# Patient Record
Sex: FEMALE | Race: BLACK OR AFRICAN AMERICAN | NOT HISPANIC OR LATINO | Employment: PART TIME | URBAN - METROPOLITAN AREA
[De-identification: names, ages, dates, MRNs, and addresses within clinical notes are randomized per-mention and may not be internally consistent; named-entity substitution may affect disease eponyms.]

---

## 2020-10-13 RX ORDER — PREDNISONE 20 MG/1
TABLET ORAL
COMMUNITY

## 2020-10-13 RX ORDER — ADAPALENE AND BENZOYL PEROXIDE 3; 25 MG/G; MG/G
GEL TOPICAL
COMMUNITY

## 2020-10-13 RX ORDER — AZELASTINE HYDROCHLORIDE 137 UG/1
SPRAY, METERED NASAL
COMMUNITY

## 2020-10-13 RX ORDER — ACYCLOVIR 50 MG/G
OINTMENT TOPICAL
COMMUNITY

## 2020-10-13 RX ORDER — NORTRIPTYLINE HYDROCHLORIDE 10 MG/1
10 CAPSULE ORAL
COMMUNITY
Start: 2020-10-06 | End: 2021-02-25

## 2020-10-13 RX ORDER — CLOPIDOGREL BISULFATE 75 MG/1
75 TABLET ORAL DAILY
COMMUNITY
Start: 2020-10-07 | End: 2020-11-10

## 2020-10-13 RX ORDER — OLOPATADINE HYDROCHLORIDE 1 MG/ML
SOLUTION/ DROPS OPHTHALMIC
COMMUNITY

## 2020-10-13 RX ORDER — TACROLIMUS 1 MG/G
OINTMENT TOPICAL
COMMUNITY

## 2020-10-13 RX ORDER — VENLAFAXINE HYDROCHLORIDE 37.5 MG/1
CAPSULE, EXTENDED RELEASE ORAL EVERY 24 HOURS
COMMUNITY
Start: 2020-07-17

## 2020-10-13 RX ORDER — HYDROXYZINE HYDROCHLORIDE 25 MG/1
TABLET, FILM COATED ORAL
COMMUNITY
End: 2021-02-25

## 2020-10-13 RX ORDER — ATORVASTATIN CALCIUM 40 MG/1
40 TABLET, FILM COATED ORAL DAILY
COMMUNITY
Start: 2020-10-06 | End: 2021-02-25

## 2020-10-13 RX ORDER — MOMETASONE FUROATE 50 UG/1
SPRAY, METERED NASAL
COMMUNITY

## 2020-10-13 RX ORDER — AZITHROMYCIN 250 MG/1
TABLET, FILM COATED ORAL
COMMUNITY
End: 2020-11-10 | Stop reason: ALTCHOICE

## 2020-10-13 RX ORDER — HYDROXYCHLOROQUINE SULFATE 200 MG/1
TABLET, FILM COATED ORAL
COMMUNITY

## 2020-10-13 RX ORDER — ACETAMINOPHEN 500 MG
500 TABLET ORAL EVERY 6 HOURS PRN
COMMUNITY
Start: 2020-10-06 | End: 2020-10-16

## 2020-10-13 RX ORDER — ASPIRIN 81 MG/1
81 TABLET, CHEWABLE ORAL DAILY
COMMUNITY
Start: 2020-10-07 | End: 2020-11-16

## 2020-10-13 RX ORDER — SPIRONOLACTONE 25 MG/1
TABLET ORAL
COMMUNITY

## 2020-10-13 RX ORDER — DESONIDE 0.5 MG/G
AEROSOL, FOAM TOPICAL
COMMUNITY

## 2020-10-13 RX ORDER — FAMOTIDINE 20 MG/1
20 TABLET, FILM COATED ORAL 2 TIMES DAILY
COMMUNITY
Start: 2020-10-06 | End: 2021-02-25

## 2020-10-14 ENCOUNTER — OFFICE VISIT (OUTPATIENT)
Dept: GASTROENTEROLOGY | Facility: CLINIC | Age: 45
End: 2020-10-14
Payer: COMMERCIAL

## 2020-10-14 VITALS
DIASTOLIC BLOOD PRESSURE: 78 MMHG | HEART RATE: 78 BPM | BODY MASS INDEX: 23.89 KG/M2 | TEMPERATURE: 98.1 F | SYSTOLIC BLOOD PRESSURE: 102 MMHG | WEIGHT: 152.2 LBS | HEIGHT: 67 IN

## 2020-10-14 DIAGNOSIS — R11.0 NAUSEA: Primary | ICD-10-CM

## 2020-10-14 DIAGNOSIS — K21.9 GASTROESOPHAGEAL REFLUX DISEASE WITHOUT ESOPHAGITIS: ICD-10-CM

## 2020-10-14 PROCEDURE — 99203 OFFICE O/P NEW LOW 30 MIN: CPT | Performed by: PHYSICIAN ASSISTANT

## 2020-10-14 RX ORDER — PANTOPRAZOLE SODIUM 40 MG/1
40 TABLET, DELAYED RELEASE ORAL DAILY
Qty: 30 TABLET | Refills: 3 | Status: SHIPPED | OUTPATIENT
Start: 2020-10-14

## 2020-10-27 ENCOUNTER — TELEPHONE (OUTPATIENT)
Dept: GASTROENTEROLOGY | Facility: CLINIC | Age: 45
End: 2020-10-27

## 2020-10-27 DIAGNOSIS — R11.0 NAUSEA: Primary | ICD-10-CM

## 2020-10-28 ENCOUNTER — HOSPITAL ENCOUNTER (OUTPATIENT)
Dept: ULTRASOUND IMAGING | Facility: HOSPITAL | Age: 45
Discharge: HOME | End: 2020-10-28
Payer: COMMERCIAL

## 2020-10-28 ENCOUNTER — HOSPITAL ENCOUNTER (OUTPATIENT)
Dept: NON INVASIVE DIAGNOSTICS | Facility: HOSPITAL | Age: 45
Discharge: HOME | End: 2020-10-28
Payer: COMMERCIAL

## 2020-10-28 DIAGNOSIS — R11.0 NAUSEA: ICD-10-CM

## 2020-10-28 DIAGNOSIS — R93.5 ABNORMAL CT OF THE ABDOMEN: Primary | ICD-10-CM

## 2020-10-28 PROCEDURE — 93975 VASCULAR STUDY: CPT

## 2020-10-28 PROCEDURE — 76705 ECHO EXAM OF ABDOMEN: CPT

## 2020-10-28 PROCEDURE — 93975 VASCULAR STUDY: CPT | Performed by: SURGERY

## 2020-11-10 ENCOUNTER — CONSULT (OUTPATIENT)
Dept: VASCULAR SURGERY | Facility: CLINIC | Age: 45
End: 2020-11-10
Payer: COMMERCIAL

## 2020-11-10 VITALS
WEIGHT: 150 LBS | BODY MASS INDEX: 23.54 KG/M2 | HEART RATE: 70 BPM | SYSTOLIC BLOOD PRESSURE: 104 MMHG | DIASTOLIC BLOOD PRESSURE: 74 MMHG | TEMPERATURE: 99.3 F | HEIGHT: 67 IN

## 2020-11-10 DIAGNOSIS — I63.9 CEREBROVASCULAR ACCIDENT (CVA), UNSPECIFIED MECHANISM (HCC): ICD-10-CM

## 2020-11-10 DIAGNOSIS — K55.1 SUPERIOR MESENTERIC ARTERY STENOSIS (HCC): Primary | ICD-10-CM

## 2020-11-10 DIAGNOSIS — R93.5 ABNORMAL CT OF THE ABDOMEN: ICD-10-CM

## 2020-11-10 PROCEDURE — 99204 OFFICE O/P NEW MOD 45 MIN: CPT | Performed by: PHYSICIAN ASSISTANT

## 2020-11-17 ENCOUNTER — OFFICE VISIT (OUTPATIENT)
Dept: GASTROENTEROLOGY | Facility: CLINIC | Age: 45
End: 2020-11-17
Payer: COMMERCIAL

## 2020-11-17 VITALS
WEIGHT: 150.2 LBS | TEMPERATURE: 97.6 F | SYSTOLIC BLOOD PRESSURE: 102 MMHG | BODY MASS INDEX: 23.57 KG/M2 | HEART RATE: 88 BPM | HEIGHT: 67 IN | DIASTOLIC BLOOD PRESSURE: 88 MMHG

## 2020-11-17 DIAGNOSIS — R14.0 ABDOMINAL BLOATING: Primary | ICD-10-CM

## 2020-11-17 PROCEDURE — 99213 OFFICE O/P EST LOW 20 MIN: CPT | Performed by: PHYSICIAN ASSISTANT

## 2021-01-12 ENCOUNTER — TELEPHONE (OUTPATIENT)
Dept: GASTROENTEROLOGY | Facility: CLINIC | Age: 46
End: 2021-01-12

## 2021-01-12 NOTE — TELEPHONE ENCOUNTER
----- Message from Pravin Beth sent at 1/12/2021 12:44 AM EST -----  Regarding: Visit Follow-Up Question  Contact: [phone]  Hi,     I won't be able to make my appointment at 1:45 pm  Please cancel my appointment and I will call to reschedule as soon as possible       Kind regards,    Pravin Beth

## 2021-02-05 ENCOUNTER — TELEPHONE (OUTPATIENT)
Dept: GASTROENTEROLOGY | Facility: CLINIC | Age: 46
End: 2021-02-05

## 2021-02-05 NOTE — TELEPHONE ENCOUNTER
----- Message from Chloe Zhao sent at 2/5/2021  2:49 PM EST -----  Regarding: Visit Follow-Up Question  Contact: [phone]  Hi Dr Catrina Casillas,    I'm still having stomach issues and would like to know if I'm in the clear for an endoscopy finally  It's been 4 months since the stroke  I would like to finally get it done to make sure there are no underlying issues going on  Please let me know so I can make an appointment specifically for that  Current medication is still: Lovenox, atorvastatin, Famotidine, and Nortryptaline  Excuse me if I butchered the spelling   I'm also still taking the probiotic off and on      Kind regards,    [employer]

## 2021-02-08 ENCOUNTER — TELEPHONE (OUTPATIENT)
Dept: GASTROENTEROLOGY | Facility: CLINIC | Age: 46
End: 2021-02-08

## 2021-02-24 ENCOUNTER — TELEPHONE (OUTPATIENT)
Dept: GASTROENTEROLOGY | Facility: CLINIC | Age: 46
End: 2021-02-24

## 2021-02-24 ENCOUNTER — ANESTHESIA EVENT (OUTPATIENT)
Dept: GASTROENTEROLOGY | Facility: HOSPITAL | Age: 46
End: 2021-02-24

## 2021-02-24 NOTE — TELEPHONE ENCOUNTER
Returned pt's call and advised her someone will be contacting her shortly to let her know what time to come in

## 2021-02-24 NOTE — TELEPHONE ENCOUNTER
----- Message from Cherise Redmond sent at 2/24/2021 11:55 AM EST -----  Regarding: RE:follow - up  Contact: [phone]  Hi, my Endoscopy is supposed to be tomorrow and no one called me to tell me what time, please advise      Kind regards,    Cherise Redmond

## 2021-02-25 ENCOUNTER — ANESTHESIA (OUTPATIENT)
Dept: GASTROENTEROLOGY | Facility: HOSPITAL | Age: 46
End: 2021-02-25

## 2021-02-25 ENCOUNTER — HOSPITAL ENCOUNTER (OUTPATIENT)
Dept: GASTROENTEROLOGY | Facility: HOSPITAL | Age: 46
Setting detail: OUTPATIENT SURGERY
Discharge: HOME | End: 2021-02-25
Attending: INTERNAL MEDICINE | Admitting: INTERNAL MEDICINE
Payer: COMMERCIAL

## 2021-02-25 VITALS
RESPIRATION RATE: 21 BRPM | DIASTOLIC BLOOD PRESSURE: 66 MMHG | WEIGHT: 152 LBS | OXYGEN SATURATION: 100 % | TEMPERATURE: 98.6 F | BODY MASS INDEX: 23.86 KG/M2 | SYSTOLIC BLOOD PRESSURE: 110 MMHG | HEART RATE: 83 BPM | HEIGHT: 67 IN

## 2021-02-25 DIAGNOSIS — K21.9 GASTROESOPHAGEAL REFLUX DISEASE WITHOUT ESOPHAGITIS: ICD-10-CM

## 2021-02-25 PROBLEM — G43.909 MIGRAINE: Status: ACTIVE | Noted: 2021-02-25

## 2021-02-25 PROBLEM — M35.01 SJOGREN'S SYNDROME WITH KERATOCONJUNCTIVITIS SICCA (HCC): Status: ACTIVE | Noted: 2020-11-16

## 2021-02-25 PROBLEM — R76.0 POSITIVE CARDIOLIPIN ANTIBODIES: Status: ACTIVE | Noted: 2020-10-31

## 2021-02-25 PROCEDURE — 88305 TISSUE EXAM BY PATHOLOGIST: CPT | Performed by: PATHOLOGY

## 2021-02-25 PROCEDURE — 43239 EGD BIOPSY SINGLE/MULTIPLE: CPT | Performed by: INTERNAL MEDICINE

## 2021-02-25 RX ORDER — PROPOFOL 10 MG/ML
INJECTION, EMULSION INTRAVENOUS AS NEEDED
Status: DISCONTINUED | OUTPATIENT
Start: 2021-02-25 | End: 2021-02-25

## 2021-02-25 RX ORDER — SODIUM CHLORIDE, SODIUM LACTATE, POTASSIUM CHLORIDE, CALCIUM CHLORIDE 600; 310; 30; 20 MG/100ML; MG/100ML; MG/100ML; MG/100ML
125 INJECTION, SOLUTION INTRAVENOUS CONTINUOUS
Status: DISCONTINUED | OUTPATIENT
Start: 2021-02-25 | End: 2021-03-01 | Stop reason: HOSPADM

## 2021-02-25 RX ORDER — LIDOCAINE HYDROCHLORIDE 10 MG/ML
INJECTION, SOLUTION EPIDURAL; INFILTRATION; INTRACAUDAL; PERINEURAL AS NEEDED
Status: DISCONTINUED | OUTPATIENT
Start: 2021-02-25 | End: 2021-02-25

## 2021-02-25 RX ADMIN — PROPOFOL 100 MG: 10 INJECTION, EMULSION INTRAVENOUS at 13:18

## 2021-02-25 RX ADMIN — SODIUM CHLORIDE, SODIUM LACTATE, POTASSIUM CHLORIDE, AND CALCIUM CHLORIDE 125 ML/HR: .6; .31; .03; .02 INJECTION, SOLUTION INTRAVENOUS at 11:39

## 2021-02-25 RX ADMIN — LIDOCAINE HYDROCHLORIDE 50 MG: 10 INJECTION, SOLUTION EPIDURAL; INFILTRATION; INTRACAUDAL; PERINEURAL at 13:18

## 2021-02-25 NOTE — H&P
History and Physical -  [facility]  Jumana Callejas 50 y o  female MRN: [id number]      HPI: Jumana Callejas is a 50y o  year old female who presents for evaluation of abdominal bloating      REVIEW OF SYSTEMS: Per the HPI, and otherwise unremarkable  Historical Information   Past Medical History:   Diagnosis Date    Stroke [facility]rebrum) ([facility] [address] )     [facility]roke [facility])      History reviewed  No pertinent surgical history  Social History   Social History     Substance and Sexual Activity   Alcohol Use Not Currently    Comment: social     Social History     Substance and Sexual Activity   Drug Use Never     Social History     Tobacco Use   Smoking Status Never Smoker   Smokeless Tobacco Never Used     Family History   Problem Relation Age of Onset    Diabetes Mother     Hypertension Mother     Hypertension Father        Meds/Allergies     (Not in a hospital admission)      Allergies   Allergen Reactions    Penicillins Hives    Penicillin G Procaine      Other reaction(s): Unknown       Objective     Blood pressure 125/80, pulse 90, temperature 99 1 °F (37 3 °C), temperature source Temporal, resp  rate 18, height 5' 7" (1 702 m), weight 68 9 kg (152 lb), SpO2 100 %  PHYSICAL EXAM    Gen: NAD  CV: RRR  CHEST: Clear  ABD: soft, NT/ND  EXT: no edema      ASSESSMENT/PLAN:  This is a 50y o  year old female here for EGD with biopsies, and she is stable and optimized for her procedure

## 2021-02-25 NOTE — ANESTHESIA PREPROCEDURE EVALUATION
Procedure:  EGD    Relevant Problems   CARDIO   (+) Migraine   (+) Superior mesenteric artery stenosis (HCC)      GI/HEPATIC   (+) GERD (gastroesophageal reflux disease)      NEURO/PSYCH   (+) Stroke (cerebrum) (HCC)      Other   (+) Positive cardiolipin antibodies   (+) Sjogren's syndrome with keratoconjunctivitis sicca (HCC)      Mild RUE weakness after stroke  On lovenox  Physical Exam    Airway    Mallampati score: II  TM Distance: >3 FB  Neck ROM: full     Dental   Comment: Denies loose teeth,     Cardiovascular  Cardiovascular exam normal    Pulmonary  Pulmonary exam normal     Other Findings  Portions of exam deferred due to low yield and/or unknown COVID status      Anesthesia Plan  ASA Score- 3     Anesthesia Type- IV sedation with anesthesia with ASA Monitors  Additional Monitors:   Airway Plan:           Plan Factors-Exercise tolerance (METS): >4 METS  Chart reviewed  Existing labs reviewed  Patient summary reviewed  Patient is not a current smoker  Induction- intravenous  Postoperative Plan-     Informed Consent- Anesthetic plan and risks discussed with patient  I personally reviewed this patient with the CRNA  Discussed and agreed on the Anesthesia Plan with the CRNA  Vinny Kessler

## 2021-02-25 NOTE — DISCHARGE INSTRUCTIONS
Upper Endoscopy   WHAT YOU NEED TO KNOW:   An upper endoscopy is also called an upper gastrointestinal (GI) endoscopy, or an esophagogastroduodenoscopy (EGD)  You may feel bloated, gassy, or have some abdominal discomfort after your procedure  Your throat may be sore for 24 to 36 hours  You may burp or pass gas from air that is still inside your body  DISCHARGE INSTRUCTIONS:   Call 911 if:   · You have sudden chest pain or trouble breathing  Seek care immediately if:   · You feel dizzy or faint  · You have trouble swallowing  · You have severe throat pain  · Your bowel movements are very dark or black  · Your abdomen is hard and firm and you have severe pain  · You vomit blood  Contact your healthcare provider if:   · You feel full or bloated and cannot burp or pass gas  · You have not had a bowel movement for 3 days after your procedure  · You have neck pain  · You have a fever or chills  · You have nausea or are vomiting  · You have a rash or hives  · You have questions or concerns about your endoscopy  Relieve a sore throat:  Suck on throat lozenges or crushed ice  Gargle with a small amount of warm salt water  Mix 1 teaspoon of salt and 1 cup of warm water to make salt water  Relieve gas and discomfort from bloating:  Lie on your right side with a heating pad on your abdomen  Take short walks to help pass gas  Eat small meals until bloating is relieved  Rest after your procedure:  Do not drive or make important decisions until the day after your procedure  Return to your normal activity as directed  You can usually return to work the day after your procedure  Follow up with your healthcare provider as directed:  Write down your questions so you remember to ask them during your visits  © Copyright [address] Information is for End User's use only and may not be sold, redistributed or otherwise used for commercial purposes   All illustrations and images included in CareNotes® are the copyrighted property of A [employer] A M [employer]  or Latanya Ross   The above information is an  only  It is not intended as medical advice for individual conditions or treatments  Talk to your doctor, nurse or pharmacist before following any medical regimen to see if it is safe and effective for you

## 2021-02-25 NOTE — ANESTHESIA POSTPROCEDURE EVALUATION
Post-Op Assessment Note    CV Status:  Stable  Pain Score: 0    Pain management: adequate     Mental Status:  Sleepy   Hydration Status:  Euvolemic   PONV Controlled:  Controlled   Airway Patency:  Patent      Post Op Vitals Reviewed: Yes      Staff: CRNA         No complications documented      /70 (02/25/21 1326)    Temp 98 6 °F (37 °C) (02/25/21 1326)    Pulse 88 (02/25/21 1326)   Resp 18 (02/25/21 1326)    SpO2 100 % (02/25/21 1326)

## 2021-06-25 ENCOUNTER — TELEPHONE (OUTPATIENT)
Dept: GASTROENTEROLOGY | Facility: CLINIC | Age: 46
End: 2021-06-25

## 2021-06-25 NOTE — TELEPHONE ENCOUNTER
----- Message from Colin Stark sent at 6/25/2021 10:08 AM EDT -----  Regarding: Test Results Question  Contact: [phone]  Hi Dr Urvashi Stein,    I'm having a problem reaching someone in your office  I need my medical records forwarded to my gastroenterologist here in [place]  The results from the tests you did as well as the Endoscopy that Dr Carlee Wilkins performed  Can you or someone in your office help me with this? My doctor in [place] is Dr Amarilys Chairez at [facility] and his fax number is [phone]      Kind regards,  Colin Stark
Printed and faxed as requested
Statement Selected

## 2021-08-29 ENCOUNTER — HOSPITAL ENCOUNTER (EMERGENCY)
Facility: HOSPITAL | Age: 46
Discharge: HOME | End: 2021-08-29
Attending: EMERGENCY MEDICINE | Admitting: EMERGENCY MEDICINE
Payer: COMMERCIAL

## 2021-08-29 ENCOUNTER — APPOINTMENT (EMERGENCY)
Dept: RADIOLOGY | Facility: HOSPITAL | Age: 46
End: 2021-08-29
Payer: COMMERCIAL

## 2021-08-29 VITALS
RESPIRATION RATE: 18 BRPM | BODY MASS INDEX: 20.82 KG/M2 | TEMPERATURE: 98.3 F | SYSTOLIC BLOOD PRESSURE: 106 MMHG | OXYGEN SATURATION: 100 % | DIASTOLIC BLOOD PRESSURE: 63 MMHG | WEIGHT: 132.94 LBS | HEART RATE: 83 BPM

## 2021-08-29 DIAGNOSIS — S63.501A WRIST SPRAIN, RIGHT, INITIAL ENCOUNTER: Primary | ICD-10-CM

## 2021-08-29 PROCEDURE — 99284 EMERGENCY DEPT VISIT MOD MDM: CPT | Performed by: EMERGENCY MEDICINE

## 2021-08-29 PROCEDURE — 99283 EMERGENCY DEPT VISIT LOW MDM: CPT

## 2021-08-29 PROCEDURE — 73110 X-RAY EXAM OF WRIST: CPT

## 2021-08-29 NOTE — ED ATTENDING ATTESTATION
8/29/2021  IPatito MD, saw and evaluated the patient  I have discussed the patient with the resident/non-physician practitioner and agree with the resident's/non-physician practitioner's findings, Plan of Care, and MDM as documented in the resident's/non-physician practitioner's note, except where noted  All available labs and Radiology studies were reviewed  I was present for key portions of any procedure(s) performed by the resident/non-physician practitioner and I was immediately available to provide assistance  At this point I agree with the current assessment done in the Emergency Department  I have conducted an independent evaluation of this patient a history and physical is as follows:    49 YO female presents after a fall, [address] to the Right wrist yesterday  States pain wasn't too bad at onset but did worsen overnight  Pt denies CP/SOB/F/C/N/V/D/C, no dysuria, burning on urination or blood in urine       Gen: Pt is in NAD  HEENT: Head is atraumatic, EOM's intact, neck has FROM  Chest: CTAB, non-tender  Heart: RRR  Abdomen: Soft, NT/ND  Musculoskeletal: FROM in all extremities, Tender in Right Wrist   Skin: No rash, no ecchymosis  Neuro: Awake, alert, oriented x4; Cranial nerves II-XII intact  Psych: Normal affect    MDM -  Pt with pain after fall, tender over the wrist  Will x-ray, likely splint and have F/U     ED Course         Critical Care Time  Procedures

## 2021-08-29 NOTE — ED PROVIDER NOTES
History  Chief Complaint   Patient presents with    Wrist Pain     Patient reports fall yesterday to which she braced herself onto her R wrist  Pain, bruising to same  Denies head strike during LOC  HPI     40-year-old female with past medical history of antiphospholipid syndrome and prior CVA without residual deficits on Xarelto who presents for evaluation right wrist pain after a fall yesterday  Patient states she was roller skating and fell onto an outstretched right hand  Denies any head strike or loss of consciousness  Denies any other injuries from the fall  Patient has pain in the lateral and medial right wrist   Denies any numbness or weakness in the right hand  Patient has not taken anything at home for pain  Denies any prior injuries to the right wrist      Prior to Admission Medications   Prescriptions Last Dose Informant Patient Reported? Taking? Adapalene-Benzoyl Peroxide (Epiduo Forte) 0 3-2 5 % GEL  Self Yes No   Sig: Epiduo Forte 0 3 %-2 5 % topical gel with pump   APPLY A THIN LAYER TO THE AFFECTED AREA(S) OF THE FACE AND/OR UPPER TRUNK AFTER WASHING BY TOPICAL ROUTE ONCE DAILY   Azelastine HCl 137 MCG/SPRAY SOLN  Self Yes No   Sig: azelastine 137 mcg (0 1 %) nasal spray aerosol   Dermatological Products, Misc   (HYLATOPIC PLUS EX)  Self Yes No   Si mg 3 (three) times a day   Enoxaparin Sodium (LOVENOX IJ)  Self Yes No   Sig: Inject as directed   Ergocalciferol (VITAMIN D2 PO)  Self Yes No   Sig: Vitamin D2 1,250 mcg (50,000 unit) capsule   acyclovir (ZOVIRAX) 5 % ointment  Self Yes No   Sig: acyclovir 5 % topical ointment   atorvastatin (LIPITOR) 40 mg tablet  Self Yes No   Sig: Take 40 mg by mouth daily   clopidogrel (PLAVIX) 75 mg tablet  Self Yes No   Sig: Take 75 mg by mouth daily   desonide (Verdeso) 0 05 % foam  Self Yes No   Sig: Verdeso 0 05 % topical foam   Apply to affected area daily as directed   famotidine (PEPCID) 20 mg tablet  Self Yes No   Sig: Take 20 mg by mouth 2 (two) times a day   hydroxychloroquine (PLAQUENIL) 200 mg tablet  Self Yes No   Sig: hydroxychloroquine 200 mg tablet   metroNIDAZOLE (METROCREAM) 0 75 % cream  Self Yes No   Sig: metronidazole 0 75 % topical cream   mometasone (NASONEX) 50 mcg/act nasal spray  Self Yes No   Sig: mometasone 50 mcg/actuation nasal spray   nortriptyline (PAMELOR) 10 mg capsule  Self Yes No   Sig: Take 10 mg by mouth   olopatadine (PATANOL) 0 1 % ophthalmic solution  Self Yes No   Sig: olopatadine 0 1 % eye drops   pantoprazole (PROTONIX) 40 mg tablet  Self No No   Sig: Take 1 tablet (40 mg total) by mouth daily   predniSONE 20 mg tablet  Self Yes No   Sig: prednisone 20 mg tablet   spironolactone (ALDACTONE) 25 mg tablet  Self Yes No   Sig: spironolactone 25 mg tablet   tacrolimus (PROTOPIC) 0 1 % ointment  Self Yes No   Sig: tacrolimus 0 1 % topical ointment   venlafaxine (Effexor XR) 37 5 mg 24 hr capsule  Self Yes No   Sig: every 24 hours      Facility-Administered Medications: None       Past Medical History:   Diagnosis Date    [facility]jogren's disease ([facility] [address] )     [facility]cerebrum[facility] ([facility] )     Stroke ([facility] )        History reviewed  No pertinent surgical history  Family History   Problem Relation Age of Onset    Diabetes Mother     Hypertension Mother     Hypertension Father      I have reviewed and agree with the history as documented  E-Cigarette/Vaping    E-Cigarette Use Never User      E-Cigarette/Vaping Substances     Social History     Tobacco Use    Smoking status: Never Smoker    Smokeless tobacco: Never Used   Vaping Use    Vaping Use: Never used   Substance Use Topics    Alcohol use: Not Currently     Comment: social    Drug use: Never        Review of Systems   Constitutional: Negative for chills and fever  Respiratory: Negative for shortness of breath  Cardiovascular: Negative for chest pain  Gastrointestinal: Negative for abdominal pain, nausea and vomiting  Musculoskeletal: Positive for arthralgias  Negative for back pain and neck pain  Neurological: Negative for dizziness, weakness, light-headedness and numbness  All other systems reviewed and are negative  Physical Exam  ED Triage Vitals [08/29/21 1450]   Temperature Pulse Respirations Blood Pressure SpO2   98 3 °F (36 8 °C) 83 18 106/63 100 %      Temp Source Heart Rate Source Patient Position - Orthostatic VS BP Location FiO2 (%)   Oral Monitor Sitting Right arm --      Pain Score       8             Orthostatic Vital Signs  Vitals:    08/29/21 1450   BP: 106/63   Pulse: 83   Patient Position - Orthostatic VS: Sitting       Physical Exam  Vitals and nursing note reviewed  Constitutional:       General: She is not in acute distress  Appearance: Normal appearance  She is well-developed and normal weight  She is not ill-appearing, toxic-appearing or diaphoretic  HENT:      Head: Normocephalic and atraumatic  Right Ear: External ear normal       Left Ear: External ear normal       Mouth/Throat:      Mouth: Mucous membranes are moist       Pharynx: Oropharynx is clear  Eyes:      Extraocular Movements: Extraocular movements intact  Conjunctiva/sclera: Conjunctivae normal    Cardiovascular:      Rate and Rhythm: Normal rate and regular rhythm  Pulses: Normal pulses  Heart sounds: Normal heart sounds  No murmur heard  No friction rub  No gallop  Pulmonary:      Effort: Pulmonary effort is normal  No respiratory distress  Breath sounds: Normal breath sounds  No wheezing or rales  Abdominal:      General: There is no distension  Palpations: Abdomen is soft  Tenderness: There is no abdominal tenderness  There is no guarding or rebound  Musculoskeletal:         General: Tenderness present  Cervical back: Neck supple  Comments: Mild tenderness to the medial and lateral right wrist  No snuffbox tenderness   ROM normal in the right wrist  No significant swelling of the wrist     Skin:     General: Skin is warm and dry  Coloration: Skin is not pale  Findings: No erythema or rash  Neurological:      General: No focal deficit present  Mental Status: She is alert and oriented to person, place, and time  Cranial Nerves: No cranial nerve deficit  Sensory: No sensory deficit  Motor: No weakness  Comments: Motor and sensation intact in the right hand  Psychiatric:         Mood and Affect: Mood normal          Behavior: Behavior normal          ED Medications  Medications - No data to display    Diagnostic Studies  Results Reviewed     None                 XR wrist 3+ views RIGHT    (Results Pending)         Procedures  Procedures      ED Course                                       MDM     61-year-old female with past medical history of antiphospholipid syndrome and prior CVA without residual deficits on Xarelto who presents for evaluation right wrist pain after a fall yesterday  No significant swelling or deformity of the wrist    Will obtain xray of the right wrist to rule out fracture  Patient declines anything for pain at this time  Reviewed xray, no acute osseous abnormality  Will place patient in a soft wrist splint  Will provide patient information for orthopedics to follow up with, instructed patient to call to make appointment if she continues to have pain for more than 2 days  Discussed strict return precautions  Provided patient a note for work  Patient expressed understanding and was agreeable for discharge        Disposition  Final diagnoses:   Wrist sprain, right, initial encounter     Time reflects when diagnosis was documented in both MDM as applicable and the Disposition within this note     Time User Action Codes Description Comment    8/29/2021  4:05 PM Maribel Tavares Add [[id number] [id number]] Wrist sprain, right, initial encounter       ED Disposition     ED Disposition Condition Date/Time Comment    Discharge Stable Sun Aug 29, 2021  4:06 PM Jennifer Apple discharge to home/self care  Follow-up Information     Follow up With Specialties Details Why Contact Info Additional Santosh Garcia [facility] Orthopedic Surgery Schedule an appointment as soon as possible for a visit in 1 week Schedule appointment in one week if you continue to have wrist pain  [address] [address]  [address] [facility], [address] [place] [address], [address] [facility] Benito Danis, [place], [address] [facility], [address] [phone]          Discharge Medication List as of 8/29/2021  4:09 PM      CONTINUE these medications which have NOT CHANGED    Details   acyclovir (ZOVIRAX) 5 % ointment acyclovir 5 % topical ointment, Historical Med      Adapalene-Benzoyl Peroxide (Epiduo Forte) 0 3-2 5 % GEL Epiduo Forte 0 3 %-2 5 % topical gel with pump   APPLY A THIN LAYER TO THE AFFECTED AREA(S) OF THE FACE AND/OR UPPER TRUNK AFTER WASHING BY TOPICAL ROUTE ONCE DAILY, Historical Med      atorvastatin (LIPITOR) 40 mg tablet Take 40 mg by mouth daily, Starting Tue 10/6/2020, Until Thu 2/25/2021, Historical Med      Azelastine HCl 137 MCG/SPRAY SOLN azelastine 137 mcg (0 1 %) nasal spray aerosol, Historical Med      clopidogrel (PLAVIX) 75 mg tablet Take 75 mg by mouth daily, Starting Wed 10/7/2020, Until Tue 11/10/2020, [place], Misc   (HYLATOPIC PLUS EX) 1 mg 3 (three) times a day, Historical Med      desonide (Verdeso) 0 05 % foam Verdeso 0 05 % topical foam   Apply to affected area daily as directed, Historical Med      Enoxaparin Sodium (LOVENOX IJ) Inject as directed, Historical Med      Ergocalciferol (VITAMIN D2 PO) Vitamin D2 1,250 mcg (50,000 unit) capsule, Historical Med      famotidine (PEPCID) 20 mg tablet Take 20 mg by mouth 2 (two) times a day, Starting Tue 10/6/2020, Until Thu 2/25/2021, Historical Med      hydroxychloroquine (PLAQUENIL) 200 mg tablet hydroxychloroquine 200 mg tablet, Historical Med      metroNIDAZOLE (METROCREAM) 0 75 % cream metronidazole 0 75 % topical cream, Historical Med      mometasone (NASONEX) 50 mcg/act nasal spray mometasone 50 mcg/actuation nasal spray, Historical Med      nortriptyline (PAMELOR) 10 mg capsule Take 10 mg by mouth, Starting Tue 10/6/2020, Until Thu 2/25/2021, Historical Med      olopatadine (PATANOL) 0 1 % ophthalmic solution olopatadine 0 1 % eye drops, Historical Med      pantoprazole (PROTONIX) 40 mg tablet Take 1 tablet (40 mg total) by mouth daily, Starting Wed 10/14/2020, Normal      predniSONE 20 mg tablet prednisone 20 mg tablet, Historical Med      spironolactone (ALDACTONE) 25 mg tablet spironolactone 25 mg tablet, Historical Med      tacrolimus (PROTOPIC) 0 1 % ointment tacrolimus 0 1 % topical ointment, Historical Med      venlafaxine (Effexor XR) 37 5 mg 24 hr capsule every 24 hours, Starting Fri 7/17/2020, Historical Med           No discharge procedures on file  PDMP Review     None           ED Provider  Attending physically available and evaluated [facility]  I managed the patient along with the ED Attending      Electronically Signed by         Alba Bejarano MD  08/29/21 6609

## 2021-08-29 NOTE — Clinical Note
Vandanamelo Dale was seen and treated in our emergency department on 8/29/2021             no lifting with right upper extremity    Diagnosis:     Lia Yip  may return to work on return date  She may return on this date: 09/03/2021    Patient may work but with no lifting with right upper extremity until 9/3/2021     If you have any questions or concerns, please don't hesitate to call        Tyler Kumar MD    ______________________________           _______________          _______________  MICHELLE PENG JE [facility] Representative                              Date                                Time

## 2022-10-12 PROBLEM — M35.01 SJOGREN'S SYNDROME WITH KERATOCONJUNCTIVITIS SICCA (HCC): Status: RESOLVED | Noted: 2020-11-16 | Resolved: 2022-10-12

## 2024-03-26 ENCOUNTER — HOSPITAL ENCOUNTER (OUTPATIENT)
Facility: HOSPITAL | Age: 49
Setting detail: OBSERVATION
Discharge: HOME | End: 2024-03-27
Attending: EMERGENCY MEDICINE | Admitting: INTERNAL MEDICINE
Payer: COMMERCIAL

## 2024-03-26 ENCOUNTER — APPOINTMENT (EMERGENCY)
Dept: RADIOLOGY | Facility: HOSPITAL | Age: 49
End: 2024-03-26
Payer: COMMERCIAL

## 2024-03-26 ENCOUNTER — APPOINTMENT (EMERGENCY)
Dept: CT IMAGING | Facility: HOSPITAL | Age: 49
End: 2024-03-26
Payer: COMMERCIAL

## 2024-03-26 DIAGNOSIS — G43.909 MIGRAINE: ICD-10-CM

## 2024-03-26 DIAGNOSIS — R29.898 LEG WEAKNESS: Primary | ICD-10-CM

## 2024-03-26 DIAGNOSIS — I63.9 STROKE (CEREBRUM) (HCC): ICD-10-CM

## 2024-03-26 LAB
ALBUMIN SERPL BCP-MCNC: 4.1 G/DL (ref 3.5–5)
ALP SERPL-CCNC: 61 U/L (ref 34–104)
ALT SERPL W P-5'-P-CCNC: 14 U/L (ref 7–52)
ANION GAP SERPL CALCULATED.3IONS-SCNC: 4 MMOL/L (ref 4–13)
APTT PPP: 39 SECONDS (ref 23–37)
AST SERPL W P-5'-P-CCNC: 18 U/L (ref 13–39)
BASOPHILS # BLD AUTO: 0.01 THOUSANDS/ÂΜL (ref 0–0.1)
BASOPHILS NFR BLD AUTO: 0 % (ref 0–1)
BILIRUB SERPL-MCNC: 0.31 MG/DL (ref 0.2–1)
BUN SERPL-MCNC: 16 MG/DL (ref 5–25)
CALCIUM SERPL-MCNC: 9.5 MG/DL (ref 8.4–10.2)
CARDIAC TROPONIN I PNL SERPL HS: <2 NG/L
CHLORIDE SERPL-SCNC: 105 MMOL/L (ref 96–108)
CO2 SERPL-SCNC: 30 MMOL/L (ref 21–32)
CREAT SERPL-MCNC: 0.74 MG/DL (ref 0.6–1.3)
EOSINOPHIL # BLD AUTO: 0.07 THOUSAND/ÂΜL (ref 0–0.61)
EOSINOPHIL NFR BLD AUTO: 2 % (ref 0–6)
ERYTHROCYTE [DISTWIDTH] IN BLOOD BY AUTOMATED COUNT: 13.2 % (ref 11.6–15.1)
GFR SERPL CREATININE-BSD FRML MDRD: 94 ML/MIN/1.73SQ M
GLUCOSE SERPL-MCNC: 109 MG/DL (ref 65–140)
GLUCOSE SERPL-MCNC: 84 MG/DL (ref 65–140)
HCT VFR BLD AUTO: 37.6 % (ref 34.8–46.1)
HGB BLD-MCNC: 12.1 G/DL (ref 11.5–15.4)
IMM GRANULOCYTES # BLD AUTO: 0.01 THOUSAND/UL (ref 0–0.2)
IMM GRANULOCYTES NFR BLD AUTO: 0 % (ref 0–2)
INR PPP: 1.62 (ref 0.84–1.19)
LYMPHOCYTES # BLD AUTO: 2.09 THOUSANDS/ÂΜL (ref 0.6–4.47)
LYMPHOCYTES NFR BLD AUTO: 55 % (ref 14–44)
MCH RBC QN AUTO: 27.8 PG (ref 26.8–34.3)
MCHC RBC AUTO-ENTMCNC: 32.2 G/DL (ref 31.4–37.4)
MCV RBC AUTO: 86 FL (ref 82–98)
MONOCYTES # BLD AUTO: 0.39 THOUSAND/ÂΜL (ref 0.17–1.22)
MONOCYTES NFR BLD AUTO: 10 % (ref 4–12)
NEUTROPHILS # BLD AUTO: 1.28 THOUSANDS/ÂΜL (ref 1.85–7.62)
NEUTS SEG NFR BLD AUTO: 33 % (ref 43–75)
NRBC BLD AUTO-RTO: 0 /100 WBCS
PLATELET # BLD AUTO: 231 THOUSANDS/UL (ref 149–390)
PMV BLD AUTO: 10.1 FL (ref 8.9–12.7)
POTASSIUM SERPL-SCNC: 3.6 MMOL/L (ref 3.5–5.3)
PROT SERPL-MCNC: 7.5 G/DL (ref 6.4–8.4)
PROTHROMBIN TIME: 20 SECONDS (ref 11.6–14.5)
RBC # BLD AUTO: 4.35 MILLION/UL (ref 3.81–5.12)
SODIUM SERPL-SCNC: 139 MMOL/L (ref 135–147)
WBC # BLD AUTO: 3.85 THOUSAND/UL (ref 4.31–10.16)

## 2024-03-26 PROCEDURE — 99284 EMERGENCY DEPT VISIT MOD MDM: CPT

## 2024-03-26 PROCEDURE — 99223 1ST HOSP IP/OBS HIGH 75: CPT | Performed by: INTERNAL MEDICINE

## 2024-03-26 PROCEDURE — 99285 EMERGENCY DEPT VISIT HI MDM: CPT | Performed by: EMERGENCY MEDICINE

## 2024-03-26 PROCEDURE — 82948 REAGENT STRIP/BLOOD GLUCOSE: CPT

## 2024-03-26 PROCEDURE — 70496 CT ANGIOGRAPHY HEAD: CPT

## 2024-03-26 PROCEDURE — 71045 X-RAY EXAM CHEST 1 VIEW: CPT

## 2024-03-26 PROCEDURE — 70498 CT ANGIOGRAPHY NECK: CPT

## 2024-03-26 PROCEDURE — 96375 TX/PRO/DX INJ NEW DRUG ADDON: CPT

## 2024-03-26 PROCEDURE — 84484 ASSAY OF TROPONIN QUANT: CPT | Performed by: EMERGENCY MEDICINE

## 2024-03-26 PROCEDURE — 85025 COMPLETE CBC W/AUTO DIFF WBC: CPT | Performed by: EMERGENCY MEDICINE

## 2024-03-26 PROCEDURE — 96365 THER/PROPH/DIAG IV INF INIT: CPT

## 2024-03-26 PROCEDURE — 96368 THER/DIAG CONCURRENT INF: CPT

## 2024-03-26 PROCEDURE — 80053 COMPREHEN METABOLIC PANEL: CPT | Performed by: EMERGENCY MEDICINE

## 2024-03-26 PROCEDURE — 36415 COLL VENOUS BLD VENIPUNCTURE: CPT | Performed by: EMERGENCY MEDICINE

## 2024-03-26 PROCEDURE — 85730 THROMBOPLASTIN TIME PARTIAL: CPT | Performed by: EMERGENCY MEDICINE

## 2024-03-26 PROCEDURE — 93005 ELECTROCARDIOGRAM TRACING: CPT

## 2024-03-26 PROCEDURE — 85610 PROTHROMBIN TIME: CPT | Performed by: EMERGENCY MEDICINE

## 2024-03-26 RX ORDER — METOCLOPRAMIDE HYDROCHLORIDE 5 MG/ML
10 INJECTION INTRAMUSCULAR; INTRAVENOUS ONCE
Status: COMPLETED | OUTPATIENT
Start: 2024-03-26 | End: 2024-03-26

## 2024-03-26 RX ORDER — MAGNESIUM SULFATE HEPTAHYDRATE 40 MG/ML
2 INJECTION, SOLUTION INTRAVENOUS ONCE
Status: COMPLETED | OUTPATIENT
Start: 2024-03-26 | End: 2024-03-26

## 2024-03-26 RX ORDER — ACETAMINOPHEN 10 MG/ML
1000 INJECTION, SOLUTION INTRAVENOUS ONCE
Status: COMPLETED | OUTPATIENT
Start: 2024-03-26 | End: 2024-03-26

## 2024-03-26 RX ORDER — DIPHENHYDRAMINE HYDROCHLORIDE 50 MG/ML
25 INJECTION INTRAMUSCULAR; INTRAVENOUS ONCE
Status: COMPLETED | OUTPATIENT
Start: 2024-03-26 | End: 2024-03-26

## 2024-03-26 RX ADMIN — MAGNESIUM SULFATE HEPTAHYDRATE 2 G: 40 INJECTION, SOLUTION INTRAVENOUS at 17:21

## 2024-03-26 RX ADMIN — ACETAMINOPHEN 1000 MG: 10 INJECTION INTRAVENOUS at 17:21

## 2024-03-26 RX ADMIN — METOCLOPRAMIDE 10 MG: 5 INJECTION, SOLUTION INTRAMUSCULAR; INTRAVENOUS at 17:21

## 2024-03-26 RX ADMIN — IOHEXOL 100 ML: 350 INJECTION, SOLUTION INTRAVENOUS at 17:11

## 2024-03-26 RX ADMIN — SODIUM CHLORIDE 1000 ML: 0.9 INJECTION, SOLUTION INTRAVENOUS at 17:28

## 2024-03-26 RX ADMIN — DIPHENHYDRAMINE HYDROCHLORIDE 25 MG: 50 INJECTION, SOLUTION INTRAMUSCULAR; INTRAVENOUS at 17:22

## 2024-03-26 NOTE — LETTER
[facility] EMERGENCY DEPARTMENT  [address]  [facility] [place] [address]  Dept: [phone]    March 27, 2024     Patient: Clive Rae   YOB: 1975   Date of Visit: 3/26/2024       To Whom it May Concern:    Clive Rae is under my professional care. She was seen in the hospital from 3/26/2024 to 03/27/24. She may return to work on 4/1/2024 without limitations.    If you have any questions or concerns, please don't hesitate to call.         Sincerely,          Jone Li MD

## 2024-03-26 NOTE — QUICK NOTE
Stroke alert called at 4:56 pm  Neurology response at immediate via phone  Patient not seen, recommendation based on information provided by ED provider over the phone    Patient is 47 yo with Sjogren's disease, chronic migraines for which she receives Botox, and stroke 2020, recent OSH admission in July 2023 for aphasia and right arm paresthesias.  Yesterday morning at 7 am, patient woke up with headache and this morning at 7 am, she noticed left sided weakness and numbness upon waking up. ED NIHSS 3 for left arm and leg weakness, no numbness noted. Subtle LFD. Patient might be taking Plavix and lovenox at home. Unclear if patient has seen hematology for possible coagulopathy.     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 Patient symptoms could be acute ischemic stroke vs recrudescence vs complicated migraines.   Continue home plavix. Please confirm if patient is taking lovenox.   Goal SBP less than 220/120  Admit to stroke pathway  Migraine cocktail as below (avoid NSAIDS, Triptans, steroids and DHE)

## 2024-03-27 ENCOUNTER — APPOINTMENT (OUTPATIENT)
Dept: MRI IMAGING | Facility: HOSPITAL | Age: 49
End: 2024-03-27
Payer: COMMERCIAL

## 2024-03-27 ENCOUNTER — APPOINTMENT (OUTPATIENT)
Dept: NON INVASIVE DIAGNOSTICS | Facility: HOSPITAL | Age: 49
End: 2024-03-27
Payer: COMMERCIAL

## 2024-03-27 VITALS
WEIGHT: 139 LBS | RESPIRATION RATE: 20 BRPM | DIASTOLIC BLOOD PRESSURE: 77 MMHG | SYSTOLIC BLOOD PRESSURE: 131 MMHG | TEMPERATURE: 97.5 F | HEIGHT: 67 IN | OXYGEN SATURATION: 100 % | HEART RATE: 75 BPM | BODY MASS INDEX: 21.82 KG/M2

## 2024-03-27 PROBLEM — M35.00 SJOGREN'S DISEASE (HCC): Status: ACTIVE | Noted: 2024-03-27

## 2024-03-27 PROBLEM — R29.90 STROKE-LIKE SYMPTOMS: Status: ACTIVE | Noted: 2024-03-27

## 2024-03-27 LAB
ANION GAP SERPL CALCULATED.3IONS-SCNC: 1 MMOL/L (ref 4–13)
ATRIAL RATE: 68 BPM
ATRIAL RATE: 76 BPM
BUN SERPL-MCNC: 13 MG/DL (ref 5–25)
CALCIUM SERPL-MCNC: 8.1 MG/DL (ref 8.4–10.2)
CHLORIDE SERPL-SCNC: 110 MMOL/L (ref 96–108)
CHOLEST SERPL-MCNC: 117 MG/DL
CO2 SERPL-SCNC: 28 MMOL/L (ref 21–32)
CREAT SERPL-MCNC: 0.82 MG/DL (ref 0.6–1.3)
ERYTHROCYTE [DISTWIDTH] IN BLOOD BY AUTOMATED COUNT: 13.4 % (ref 11.6–15.1)
EST. AVERAGE GLUCOSE BLD GHB EST-MCNC: 128 MG/DL
GFR SERPL CREATININE-BSD FRML MDRD: 83 ML/MIN/1.73SQ M
GLUCOSE SERPL-MCNC: 80 MG/DL (ref 65–140)
HBA1C MFR BLD: 6.1 %
HCT VFR BLD AUTO: 33.7 % (ref 34.8–46.1)
HDLC SERPL-MCNC: 58 MG/DL
HGB BLD-MCNC: 10.9 G/DL (ref 11.5–15.4)
LDLC SERPL CALC-MCNC: 52 MG/DL (ref 0–100)
MCH RBC QN AUTO: 27.7 PG (ref 26.8–34.3)
MCHC RBC AUTO-ENTMCNC: 32.3 G/DL (ref 31.4–37.4)
MCV RBC AUTO: 86 FL (ref 82–98)
P AXIS: 85 DEGREES
P AXIS: 87 DEGREES
PLATELET # BLD AUTO: 193 THOUSANDS/UL (ref 149–390)
PMV BLD AUTO: 10.3 FL (ref 8.9–12.7)
POTASSIUM SERPL-SCNC: 3.9 MMOL/L (ref 3.5–5.3)
PR INTERVAL: 178 MS
PR INTERVAL: 186 MS
QRS AXIS: 70 DEGREES
QRS AXIS: 71 DEGREES
QRSD INTERVAL: 76 MS
QRSD INTERVAL: 78 MS
QT INTERVAL: 396 MS
QT INTERVAL: 402 MS
QTC INTERVAL: 427 MS
QTC INTERVAL: 445 MS
RBC # BLD AUTO: 3.94 MILLION/UL (ref 3.81–5.12)
SODIUM SERPL-SCNC: 139 MMOL/L (ref 135–147)
T WAVE AXIS: 63 DEGREES
T WAVE AXIS: 66 DEGREES
TRIGL SERPL-MCNC: 35 MG/DL
VENTRICULAR RATE: 68 BPM
VENTRICULAR RATE: 76 BPM
WBC # BLD AUTO: 3.48 THOUSAND/UL (ref 4.31–10.16)

## 2024-03-27 PROCEDURE — 36415 COLL VENOUS BLD VENIPUNCTURE: CPT | Performed by: INTERNAL MEDICINE

## 2024-03-27 PROCEDURE — 80061 LIPID PANEL: CPT | Performed by: INTERNAL MEDICINE

## 2024-03-27 PROCEDURE — 97161 PT EVAL LOW COMPLEX 20 MIN: CPT

## 2024-03-27 PROCEDURE — 97165 OT EVAL LOW COMPLEX 30 MIN: CPT

## 2024-03-27 PROCEDURE — 80048 BASIC METABOLIC PNL TOTAL CA: CPT | Performed by: INTERNAL MEDICINE

## 2024-03-27 PROCEDURE — 83036 HEMOGLOBIN GLYCOSYLATED A1C: CPT | Performed by: INTERNAL MEDICINE

## 2024-03-27 PROCEDURE — 85027 COMPLETE CBC AUTOMATED: CPT | Performed by: INTERNAL MEDICINE

## 2024-03-27 PROCEDURE — 99239 HOSP IP/OBS DSCHRG MGMT >30: CPT | Performed by: INTERNAL MEDICINE

## 2024-03-27 PROCEDURE — 99291 CRITICAL CARE FIRST HOUR: CPT | Performed by: STUDENT IN AN ORGANIZED HEALTH CARE EDUCATION/TRAINING PROGRAM

## 2024-03-27 PROCEDURE — 93010 ELECTROCARDIOGRAM REPORT: CPT | Performed by: INTERNAL MEDICINE

## 2024-03-27 PROCEDURE — 70551 MRI BRAIN STEM W/O DYE: CPT

## 2024-03-27 RX ORDER — HYDROXYCHLOROQUINE SULFATE 200 MG/1
200 TABLET, FILM COATED ORAL
Status: DISCONTINUED | OUTPATIENT
Start: 2024-03-27 | End: 2024-03-27 | Stop reason: HOSPADM

## 2024-03-27 RX ORDER — KETOTIFEN FUMARATE 0.35 MG/ML
1 SOLUTION/ DROPS OPHTHALMIC 2 TIMES DAILY
Status: DISCONTINUED | OUTPATIENT
Start: 2024-03-27 | End: 2024-03-27 | Stop reason: HOSPADM

## 2024-03-27 RX ORDER — METHYLPREDNISOLONE 4 MG/1
TABLET ORAL
Qty: 21 EACH | Refills: 0 | Status: SHIPPED | OUTPATIENT
Start: 2024-03-27

## 2024-03-27 RX ORDER — FAMOTIDINE 20 MG/1
20 TABLET, FILM COATED ORAL 2 TIMES DAILY
Status: DISCONTINUED | OUTPATIENT
Start: 2024-03-27 | End: 2024-03-27 | Stop reason: HOSPADM

## 2024-03-27 RX ORDER — ONDANSETRON 2 MG/ML
4 INJECTION INTRAMUSCULAR; INTRAVENOUS EVERY 6 HOURS PRN
Status: DISCONTINUED | OUTPATIENT
Start: 2024-03-27 | End: 2024-03-27 | Stop reason: HOSPADM

## 2024-03-27 RX ORDER — CLOPIDOGREL BISULFATE 75 MG/1
75 TABLET ORAL DAILY
Status: DISCONTINUED | OUTPATIENT
Start: 2024-03-27 | End: 2024-03-27

## 2024-03-27 RX ORDER — ACETAMINOPHEN 325 MG/1
650 TABLET ORAL EVERY 6 HOURS PRN
Status: DISCONTINUED | OUTPATIENT
Start: 2024-03-27 | End: 2024-03-27 | Stop reason: HOSPADM

## 2024-03-27 RX ORDER — PANTOPRAZOLE SODIUM 40 MG/1
40 TABLET, DELAYED RELEASE ORAL DAILY
Status: DISCONTINUED | OUTPATIENT
Start: 2024-03-27 | End: 2024-03-27 | Stop reason: HOSPADM

## 2024-03-27 RX ORDER — HEPARIN SODIUM 5000 [USP'U]/ML
5000 INJECTION, SOLUTION INTRAVENOUS; SUBCUTANEOUS EVERY 8 HOURS SCHEDULED
Status: DISCONTINUED | OUTPATIENT
Start: 2024-03-27 | End: 2024-03-27

## 2024-03-27 RX ORDER — ATORVASTATIN CALCIUM 40 MG/1
40 TABLET, FILM COATED ORAL DAILY
Status: DISCONTINUED | OUTPATIENT
Start: 2024-03-27 | End: 2024-03-27 | Stop reason: HOSPADM

## 2024-03-27 RX ADMIN — PANTOPRAZOLE SODIUM 40 MG: 40 TABLET, DELAYED RELEASE ORAL at 08:16

## 2024-03-27 RX ADMIN — CLOPIDOGREL 75 MG: 75 TABLET ORAL at 08:16

## 2024-03-27 RX ADMIN — FAMOTIDINE 20 MG: 20 TABLET, FILM COATED ORAL at 08:15

## 2024-03-27 RX ADMIN — KETOTIFEN FUMARATE 1 DROP: 0.25 SOLUTION/ DROPS OPHTHALMIC at 09:09

## 2024-03-27 RX ADMIN — HYDROXYCHLOROQUINE SULFATE 200 MG: 200 TABLET, FILM COATED ORAL at 08:16

## 2024-03-27 RX ADMIN — RIVAROXABAN 20 MG: 20 TABLET, FILM COATED ORAL at 12:14

## 2024-03-27 RX ADMIN — ATORVASTATIN CALCIUM 40 MG: 40 TABLET, FILM COATED ORAL at 08:15

## 2024-03-27 NOTE — CASE MANAGEMENT
Case Management Assessment & Discharge Planning Note    Patient name Clive Rae  Location ED 11/ED 11 MRN [id number]  : 1975 Date 3/27/2024       Current Admission Date: 3/26/2024  Current Admission Diagnosis:Stroke (cerebrum) (HCC)   Patient Active Problem List    Diagnosis Date Noted    Stroke-like symptoms 2024    Sjogren's disease (HCC) 2024    GERD (gastroesophageal reflux disease) 2021    Migraine 2021    Superior mesenteric artery stenosis (HCC) 11/10/2020    Stroke (cerebrum) (HCC)     Positive cardiolipin antibodies 10/31/2020      LOS (days): 0  Geometric Mean LOS (GMLOS) (days):   Days to GMLOS:     OBJECTIVE:              Current admission status: Observation       Preferred Pharmacy:   PATIENT/FAMILY REPORTS NO PREFERRED PHARMACY  No address on file      Primary Care Provider: No primary care provider on file.    Primary Insurance: [facility]  Secondary Insurance:     ASSESSMENT:  Active Health Care Proxies    There are no active Health Care Proxies on file.       Advance Directives  Does patient have a Health Care POA?: No  Was patient offered paperwork?: Yes (Accepted)  Does patient currently have a Health Care decision maker?: Yes, please see Health Care Proxy section  Does patient have Advance Directives?: No  Was patient offered paperwork?: Yes (Accepted)  Primary Contact: Melissa Frances  [phone]  Notify on admission         Readmission Root Cause  30 Day Readmission: No    Patient Information  Admitted from:: Home  Mental Status: Alert  During Assessment patient was accompanied by: Not accompanied during assessment  Assessment information provided by:: Patient  Primary Caregiver: Self  Support Systems: Self, Family members  County of Residence: Other (specify in comment box) ([place])  What city do you live in?: [facility]  Home entry access options. Select all that apply.: Elevator (4th floor)  Type of Current Residence:  Apartment  Upon entering residence, is there a bedroom on the main floor (no further steps)?: Yes  Upon entering residence, is there a bathroom on the main floor (no further steps)?: Yes  Living Arrangements: Lives Alone  Is patient a ?: No    Activities of Daily Living Prior to Admission  Functional Status: Independent  Completes ADLs independently?: Yes  Ambulates independently?: Yes  Does patient use assisted devices?: No  Does patient currently own DME?: No  Does patient have a history of Outpatient Therapy (PT/OT)?: No  Does the patient have a history of Short-Term Rehab?: No  Does patient have a history of HHC?: No  Does patient currently have HHC?: No         Patient Information Continued  Income Source: Employed  Does patient have prescription coverage?: Yes  Does patient receive dialysis treatments?: No  Does patient have a history of substance abuse?: No  Does patient have a history of Mental Health Diagnosis?: No         Means of Transportation  Means of Transport to [facility]:: Public Transportation - [employer] (Takes public transport in [place])      Social Determinants of Health (SDOH)      Flowsheet Row Most Recent Value   Housing Stability    In the last 12 months, was there a time when you were not able to pay the mortgage or rent on time? N   In the last 12 months, how many places have you lived? 1   In the last 12 months, was there a time when you did not have a steady place to sleep or slept in a shelter (including now)? N   Transportation Needs    In the past 12 months, has lack of transportation kept you from medical appointments or from getting medications? no   In the past 12 months, has lack of transportation kept you from meetings, work, or from getting things needed for daily living? No   Food Insecurity    Within the past 12 months, you worried that your food would run out before you got the money to buy more. Never true   Utilities    In the past 12 months has the electric, gas, oil, or water  company threatened to shut off services in your home? No            DISCHARGE DETAILS:    Discharge planning discussed with:: Patient     Comments - [place] of Choice: CM met with the patient to introduce self and role. The patient  states she is feeling better and relieved to known no new CVA as she has had one in the past.  She states she will d/c to her parent's home here in the [facility] for a few days and return to her [place] apartment and to work.  She plans to locate a new Neurologist who works with younger  stroke patient's.  At this time the  only identified needs are a LYFT transport to her parents and she was receptive to AD/POA paperwork.  CM gave her the paperwork and coordinated the LYFT.  CM contacted family/caregiver?: No- see comments (Patient  is A&O x4)             Contacts  Patient Contacts: Melissa Frances  [phone]  Notify on admission  Relationship to Patient:: Family  Reason/Outcome: Emergency Contact    Requested Home Health Care         Is the patient interested in HHC at discharge?: No    DME Referral Provided  Referral made for DME?: No    Other Referral/Resources/Interventions Provided:  Interventions: Transportation, Advanced Directives  Referral Comments: Patietn only in need of LYFT and receptive to AD/POA  paperwork. LYFT waiver signed by patient  and placed in MR               Transport at Discharge : Other (Comment), Ride Share     Number/Name of Dispatcher: marie  +6   5.0  VEHICLE DETAILS    Russ Martinez [phone]  [id number]     ETA of Transport (Date): 03/27/24  ETA of Transport (Time): 1330  Transport Service Arrived: Yes  Transfer Mode: Other (Comment) (LYFT- Waiver signed)

## 2024-03-27 NOTE — ASSESSMENT & PLAN NOTE
Stroke alert called. CT head suggested right insular /operculum hypodensity that needs further characterization with MRI. Per ED Neurology requests stay on this campus, give migraine cocktail and follow stroke protocl awaiting MRI.   Continue plavix  Pt reports she is taking xarelto, next dose due tomorrow evening. Will hold until seen by neurology in am. Heparin for DVT prophylaxis  Migraine cocktail was helpful, pt reports resting more comfortably.

## 2024-03-27 NOTE — DISCHARGE SUMMARY
[facility]  Discharge- Clive Rae 1975, 48 y.o. female MRN: [id number]  Unit/Bed#: ED 11 Encounter: [id number]  Primary Care Provider: No primary care provider on file.   Date and time admitted to hospital: 3/26/2024  4:47 PM    Admitting Provider:  Galina Gloria MD  Discharge Provider:  Jone Li MD  Admission Date: 3/26/2024       Discharge Date: 03/27/24   LOS: 0  Primary Care Physician at Discharge: No primary care provider on file. None    HOSPITAL COURSE:  Clive Rae is a 48 y.o. female who presented     Mobility:   Basic Mobility Inpatient Raw Score: 24  JH-HLM Goal: 8: Walk 250 feet or more  JH-HLM Achieved: 8: Walk 250 feet ot more  JH-HLM Goal achieved. Continue to encourage appropriate mobility.    REASON FOR ADMISSION/ ADMISSION DIAGNOSES    Stroke like symptoms    DISCHARGE DIAGNOSES    Assessment/Plan   Stroke-like symptoms  Assessment & Plan  Pt developed headache and weakness of the LLE greater that the LUE. At the time of my exam, the left upper extremity was close to baseline. CTA Reports : right posterior communicating artery infundibulm vs aneurysm. 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  Note review of record shows other vascular anomolies (? Stenosis of superior mesenteric artery listed).   Patient says her neurologist told her to take only Xarelto- she wants to follow up with her own neurologist      * Stroke (cerebrum) (HCC)  Assessment & Plan  Stroke alert called. CT head suggested right insular /operculum hypodensity that needs further characterization with MRI.  Migraine cocktail was helpful, pt reports resting more comfortably.   Discharge on oral medrol dose katerina     Sjogren's disease (HCC)  Assessment & Plan  Continue hydroxychlorquine        Migraine  Assessment & Plan  Headache symptom have improved with the cocktail prescribed by Neuro and ED MD. Pt resting comfortable  Neurology recommended discharge on Xarelto      GERD (gastroesophageal reflux disease)  Assessment & Plan  Continue pepcid and protonix.     Positive cardiolipin antibodies  Assessment & Plan  Pt with sjorgrens continue hydroychloroquin  Provide eye drops and substitute saliva if patient needs.   CONSULTING PROVIDERS   IP CONSULT TO NEUROLOGY  IP CONSULT TO NEUROLOGY  IP CONSULT TO CASE MANAGEMENT  IP CONSULT TO NUTRITION SERVICES    PROCEDURES PERFORMED  * No surgery found *    RADIOLOGY RESULTS  MRI brain wo contrast    Result Date: 3/27/2024  Impression: Focal encephalomalacia and gliosis along the right posterior insula suggestive of prior ischemic injury. No acute infarction, edema, or mass effect. Few punctate hyperintense T2/FLAIR foci are noted within the periventricular and subcortical white matter which are nonspecific and can be seen with vasculitis, migrainous angiopathy, or or precocious microangiopathic changes. Workstation performed: [id number]     CT stroke alert brain    Result Date: 3/26/2024  Impression: -Hypodensity involving the right insula/operculum likely representing age-indeterminate infarct, favored to be subacute to chronic. MRI can be obtained for further evaluation. -No intracranial hemorrhage. I personally communicated the findings via telephone with Astrid Sharma at 5:34 p.m. on 3/26/2024. Workstation performed: [id number]     CTA stroke alert (head/neck)    Result Date: 3/26/2024  Impression: CTA head: -No large vessel occlusion or high-grade stenosis. -Right posterior communicating artery infundibulum versus 1 mm aneurysm involving the right ICA terminus. CTA neck: -Probable right proximal internal carotid artery carotid web. Differential includes atheromatous plaque. Recommend neurovascular consult. -Question additional smaller carotid web involving the proximal left internal carotid artery versus atheromatous plaque. -No high-grade stenosis, dissection or aneurysm. I personally communicated the findings via telephone with Astrid  "Edith at 5:34 p.m. on 3/26/2024. Workstation performed: [id number]     XR stroke alert portable chest    Result Date: 3/26/2024  Impression: No acute pulmonary pathology. Workstation performed: [id number]       LABS  Results from last 7 days   Lab Units 03/27/24  0519 03/26/24  1647   WBC Thousand/uL 3.48* 3.85*   HEMOGLOBIN g/dL 10.9* 12.1   HEMATOCRIT % 33.7* 37.6   MCV fL 86 86   PLATELETS Thousands/uL 193 231   INR   --  1.62*     Results from last 7 days   Lab Units 03/27/24  0519 03/26/24  1647   SODIUM mmol/L 139 139   POTASSIUM mmol/L 3.9 3.6   CHLORIDE mmol/L 110* 105   CO2 mmol/L 28 30   BUN mg/dL 13 16   CREATININE mg/dL 0.82 0.74   CALCIUM mg/dL 8.1* 9.5   ALBUMIN g/dL  --  4.1   TOTAL BILIRUBIN mg/dL  --  0.31   ALK PHOS U/L  --  61   ALT U/L  --  14   AST U/L  --  18   EGFR ml/min/1.73sq m 83 94   GLUCOSE RANDOM mg/dL 80 109                  Results from last 7 days   Lab Units 03/26/24  1657   POC GLUCOSE mg/dl 84                 Results from last 7 days   Lab Units 03/27/24  0519   TRIGLYCERIDES mg/dL 35   CHOLESTEROL mg/dL 117   LDL CALC mg/dL 52   HDL mg/dL 58       Cultures:                   PHYSICAL EXAM:  Vitals:   Blood Pressure: 131/77 (03/27/24 1135)  Pulse: 75 (03/27/24 1135)  Temperature: 97.5 °F (36.4 °C) (03/27/24 0722)  Temp Source: Oral (03/27/24 0722)  Respirations: 20 (03/27/24 1135)  Height: 5' 7\" (170.2 cm) (03/26/24 1637)  Weight - Scale: 63 kg (139 lb) (03/26/24 1637)  SpO2: 100 % (03/27/24 1135)    General appearance: alert, appears stated age, and cooperative  HEENT - atraumatic and normocephalic  Neck- supple  Skin - no fresh rash  Extremities no fresh focal deformities  Cardiovascular- S1-S2 heard  Respiratory- bilateral air entry present, no crackles or rhonchi  Skin - no fresh rash  Abdomen - normal bowel sounds present, no rebound tenderness  CNS- No fresh focal deficits  Psych- no acute psychosis     Planned Re-admission: No  Discharge Disposition: Home/Self Care    Test " Results Pending at Discharge:   Pending Labs       Order Current Status    Hemoglobin A1c w/EAG Estimation In process        Incidental findings: Nojne    Medications   Summary of Medication Adjustments made as a result of this hospitalization: No changes  Medication Dosing Tapers - Please refer to Discharge Medication List for details on any medication dosing tapers (if applicable to patient).  Discharge Medication List: See after visit summary for reconciled discharge medications.     Diet restrictions: No restrictions        Diet Orders   (From admission, onward)                 Start     Ordered    03/27/24 0217  Diet Cardiovascular; Cardiac  Diet effective now        References:    Adult Nutrition Support Algorithm    RD Therapeutic Diet Order Protocol   Question Answer Comment   Diet Type Cardiovascular    Cardiac Cardiac    RD to adjust diet per protocol? Yes        03/27/24 0216                  Activity restrictions: No strenuous activity  Discharge Condition: stable    Outpatient Follow-Up and Discharge Instructions  See after visit summary section titled Discharge Instructions for information provided to patient and family.      Code Status: Level 1 - Full Code  Discharge Statement   I spent 35 minutes discharging the patient. This time was spent on the day of discharge. Greater than 50% of total time was spent with the patient and / or family counseling and / or coordination of care.    Jone Li MD  [facility] Internal Medicine    ** Please Note: This note has been constructed using a voice recognition system. **

## 2024-03-27 NOTE — PHYSICAL THERAPY NOTE
Physical Therapy Evaluation     Patient's Name: Clive Rae    Admitting Diagnosis  No admission diagnoses are documented for this encounter.    Problem List  Patient Active Problem List   Diagnosis    Stroke (cerebrum) (HCC)    Superior mesenteric artery stenosis (HCC)    Positive cardiolipin antibodies    GERD (gastroesophageal reflux disease)    Migraine    Stroke-like symptoms    Sjogren's disease (HCC)     Past Medical History  Past Medical History:   Diagnosis Date    Sjogren's disease (HCC)     Stroke (cerebrum) (HCC)     Stroke (HCC)      Past Surgical History  History reviewed. No pertinent surgical history.     03/27/24 0729   PT Last Visit   PT Visit Date 03/27/24   Note Type   Note type Evaluation   Additional Comments Vitals at start of session: /82, HR 74 bpm   Pain Assessment   Pain Assessment Tool 0-10   Pain Score 5   Pain Location/Orientation Orientation: Left;Location: Head   Pain Onset/Description Descriptor: Headache   Hospital Pain Intervention(s) Repositioned;Ambulation/increased activity   Restrictions/Precautions   Weight Bearing Precautions Per Order No   Braces or Orthoses Other (Comment)  (history of back brace)   Other Precautions Multiple lines;Telemetry;Fall Risk;Pain   Home Living   Type of Home Apartment  (10th floor)   Home Layout One level;Able to live on main level with bedroom/bathroom;Performs ADLs on one level;Elevator   Bathroom Shower/Tub Tub/shower unit   Bathroom Toilet Standard   Bathroom Equipment Other (Comment)  (none per patient)   Bathroom Accessibility Accessible   Home Equipment Other (Comment)  (none per patient)   Additional Comments Pt ambulates without an AD.   Prior Function   Level of [place] Independent with functional mobility;Independent with ADLs   Lives With Alone   Receives Help From Family   IADLs Independent with driving;Independent with meal prep;Independent with medication management   Falls in the last 6 months 0   Vocational Full  "time employment   Comments Pt resides in [place], [place]. [place] home listed [place]. Pt is currently visiting her parents in [place].   General   Family/Caregiver Present No   Cognition   Overall Cognitive Status WFL   Arousal/Participation Alert   Attention Within functional limits   Orientation Level Oriented X4   Memory Within functional limits   Following Commands Follows all commands and directions without difficulty   Comments Pt agreeable to PT.   Subjective   Subjective \"My left leg felt weak, but I think it's better.\"   RLE Assessment   RLE Assessment WFL   LLE Assessment   LLE Assessment WFL   Vision-Basic Assessment   Current Vision Wears glasses all the time   Light Touch   RLE Light Touch Grossly intact   LLE Light Touch Grossly intact   Bed Mobility   Supine to Sit 6  Modified independent   Additional items HOB elevated   Sit to Supine 6  Modified independent   Additional items HOB elevated   Transfers   Sit to Stand 7  Independent   Stand to Sit 7  Independent   Ambulation/Elevation   Gait pattern WNL   Gait Assistance 7  Independent   Assistive Device None   Distance 330 feet   Balance   Static Sitting Normal   Dynamic Sitting Good   Static Standing Good   Dynamic Standing Good   Ambulatory Good   Endurance Deficit   Endurance Deficit No   Activity Tolerance   Activity Tolerance Patient tolerated treatment well   Medical Staff Made Aware care coordination with OT   Assessment   Prognosis Good   Problem List   (no acute PT impairments)   Assessment Pt is 48 year old female seen for PT evaluation s/p admit to [facility] on 3/26/2024 with Stroke (cerebrum) ([facility]). PT consulted to assess pt's functional mobility and discharge needs. Order placed for PT evaluation and treatment, with up and out of bed as tolerated order. Comorbidities affecting pt's physical performance at time of assessment include positive cardiolipin antibodies, GERD, migraine, stroke like symptoms, and sjogren's disease. Prior to " hospitalization, pt was independent with all functional mobility without an AD. Pt ambulates unrestricted distances on all terrain and elevations. Pt resides alone in a one level apartment with elevator access. Pt resides in [place], [place]. [place]'s [place] home listed [place]. Pt is currently visiting her parents in [place]. Personal factors affecting pt at time of initial evaluation include limited home support. Please find objective findings from PT assessment regarding body systems outlined above. The following objective measures were performed on initial evaluation Barthel Index: 100/100, Modified [place]: 0 no symptoms at all, and AM-PAC 6-Clicks: 24/24. Pt's clinical presentation is currently stable seen in pt's presentation of need for ongoing medical management/monitoring. Pt is independent with transfers and functional mobility. From a PT standpoint, recommendation at time of discharge would be home with no post acute rehabilitation needs. Pt is at her baseline functional status. Pt with no acute PT impairments. Plan to discontinue PT at this time.   Barriers to Discharge None   Goals   Patient Goals to go home   Plan   PT Frequency Other (Comment)  (discontinue PT)   Discharge Recommendation   Rehab Resource Intensity Level, PT No post-acute rehabilitation needs   AM-PAC Basic Mobility Inpatient   Turning in Flat Bed Without Bedrails 4   Lying on Back to Sitting on Edge of Flat Bed Without Bedrails 4   Moving Bed to Chair 4   Standing Up From Chair Using Arms 4   Walk in Room 4   Climb 3-5 Stairs With Railing 4   Basic Mobility Inpatient Raw Score 24   Basic Mobility Standardized Score 57.68   [facility] Highest Level Of Mobility   -HLM Goal 8: Walk 250 feet or more   -HLM Achieved 8: Walk 250 feet ot more   Modified [place] Scale   Modified [place] Scale 0   Barthel Index   Feeding 10   Bathing 5   Grooming Score 5   Dressing Score 10   Bladder Score 10   Bowels Score 10   Toilet Use Score 10   Transfers (Bed/Chair)  Score 15   Mobility (Level Surface) Score 15   Stairs Score 10   Barthel Index Score 100     PT Evaluation Time: 6055-2223  Virginia Lazo, PT, DPT

## 2024-03-27 NOTE — SPEECH THERAPY NOTE
Speech Language/Pathology      Patient passed nursing dysphagia screen; presently tolerating oral diet.  Need for formal evaluation of swallow function is not indicated at this time.  Please re-order should status change or concerns arise.     Monika Martinez MS, CCC-SLP  Speech-Language Pathologist  [place] #[id number]  [place] #[id number]

## 2024-03-27 NOTE — H&P
[facility]  H&P  Name: Clive Rae 48 y.o. female I MRN: [id number]  Unit/Bed#: ED 11 I Date of Admission: 3/26/2024   Date of Service: 3/27/2024 I Hospital Day: 0  ( Delay in documentation due to Computer down time: > 1 hour )    Assessment/Plan   Stroke-like symptoms  Assessment & Plan  Pt developed headache and weakness of the LLE greater that the LUE. At the time of my exam, the left upper extremity was close to baseline. CTA Reports : right posterior communicating artery infundibulm vs aneurysm. 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  Note review of record shows other vascular anomolies (? Stenosis of superior mesenteric artery listed). Neurology had request to admit patient for further testing on our campus and treat with migraine cocktail which has helped her headache some. She reports some improvement in her upper extremity weakness. Patient reports she is taking xarelto( not lovenox) at home and plavix due to previous stroke which disturbed her speech. Plan for further clarification of anatomy with MRI, and echo to assess slight mururmer. Neurology to reasses in am.   Neurology consult   Keep plavix in place. For tonight order heparin for dvt prophylaxis and decide on continuing her evening xarelto in the am incase pt needs to be proceduralized .   MRI asap  F/u echo  Resume xarelto in am if not at risk for bleed or needs procedure to assess aneurysm.         * Stroke (cerebrum) (HCC)  Assessment & Plan  Stroke alert called. CT head suggested right insular /operculum hypodensity that needs further characterization with MRI. Per ED Neurology requests stay on this campus, give migraine cocktail and follow stroke protocl awaiting MRI.   Continue plavix  Pt reports she is taking xarelto, next dose due tomorrow evening. Will hold until seen by neurology in am. Heparin for DVT prophylaxis  Migraine cocktail was helpful, pt reports resting more comfortably.     Sjogren's  disease (HCC)  Assessment & Plan  Continue hydroxychlorquine      Migraine  Assessment & Plan  Headache symptom have improved with the cocktail prescribed by Neuro and ED MD. Pt resting comfortable  Continue to monitor on stroke protocol  Resume xarelto if pt not at risk or needing procedure in am.   Continue plavix, statin,    GERD (gastroesophageal reflux disease)  Assessment & Plan  Continue pepcid and protonix.    Positive cardiolipin antibodies  Assessment & Plan  Pt with sjorgrens continue hydroychloroquin  Provide eye drops and substitute saliva if patient needs.        VTE Prophylaxis:  Pt on xarelto, last taken 3/25 evening in light of potential aneurysm dx and noted insular hypodensity, will not deliver even dose tonight , plavix is in place. Neuro to approve xarelto restart after seeing mri findings   / sequential compression device   Code Status: Full code  POLST: POLST form is not discussed and not completed at this time.  Discussion with family: Pt reports luly Rae is her emergency contact can be updated tomorrow    Anticipated Length of Stay:  Patient will be admitted on an Observation basis with an anticipated length of stay of  less than  2 midnights.   Justification for Hospital Stay: Further testing regarding potential stroke, vascular anomaly.     Total Time for Visit, including Counseling / Coordination of Care: 45 minutes.  Note computer down time occurred just as orders being placed, note delayed due to extended down time.    Chief Complaint:   Karolina and LL ext weakness, HA    History of Present Illness:    Clive Rae is a 48 y.o. female who presents with acute onset headache with associated left sided weakness. Patient reports that she had a previous stroke for which she takes xarelto and plavix. At the time of my interview she stated her headache was much better after migraine cocktail and  she feels her rue is not as week. LL ext stil with subtle weakness. Plan for stroke  protocol and neurology assessment with continued plavix and clarification to resume xarelto tomorrow evening after seeing MRI and consult results.     Review of Systems:    Review of Systems   Constitutional:  Negative for chills and fever.   HENT:  Negative for ear pain and sore throat.    Eyes:  Negative for pain and visual disturbance.   Respiratory:  Negative for cough and shortness of breath.    Cardiovascular:  Negative for chest pain and palpitations.   Gastrointestinal:  Negative for abdominal pain and vomiting.   Genitourinary:  Negative for dysuria and hematuria.   Musculoskeletal:  Negative for arthralgias and back pain.   Skin:  Negative for color change and rash.   Neurological:  Positive for weakness (LuE, LLE) and headaches. Negative for seizures and syncope.   All other systems reviewed and are negative.      Past Medical and Surgical History:     Past Medical History:   Diagnosis Date    Sjogren's disease (HCC)     Stroke (cerebrum) (HCC)     Stroke (HCC)        History reviewed. No pertinent surgical history.    Meds/Allergies:    Prior to Admission medications    Medication Sig Start Date End Date Taking? Authorizing Provider   acyclovir (ZOVIRAX) 5 % ointment acyclovir 5 % topical ointment    Historical Provider, MD   Adapalene-Benzoyl Peroxide (Epiduo Forte) 0.3-2.5 % GEL Epiduo Forte 0.3 %-2.5 % topical gel with pump   APPLY A THIN LAYER TO THE AFFECTED AREA(S) OF THE FACE AND/OR UPPER TRUNK AFTER WASHING BY TOPICAL ROUTE ONCE DAILY    Historical Provider, MD   atorvastatin (LIPITOR) 40 mg tablet Take 40 mg by mouth daily 10/6/20 2/25/21  Historical Provider, MD   Azelastine HCl 137 MCG/SPRAY SOLN azelastine 137 mcg (0.1 %) nasal spray aerosol    Historical Provider, MD   clopidogrel (PLAVIX) 75 mg tablet Take 75 mg by mouth daily 10/7/20 11/10/20  Historical Provider, MD   Dermatological Products, Misc. (HYLATOPIC PLUS EX) 1 mg 3 (three) times a day    Historical Provider, MD   desonide  (Verdeso) 0.05 % foam Verdeso 0.05 % topical foam   Apply to affected area daily as directed    Historical Provider, MD   Enoxaparin Sodium (LOVENOX IJ) Inject as directed    Historical Provider, MD   Ergocalciferol (VITAMIN D2 PO) Vitamin D2 1,250 mcg (50,000 unit) capsule    Historical Provider, MD   famotidine (PEPCID) 20 mg tablet Take 20 mg by mouth 2 (two) times a day 10/6/20 2/25/21  Historical Provider, MD   hydroxychloroquine (PLAQUENIL) 200 mg tablet hydroxychloroquine 200 mg tablet    Historical Provider, MD   metroNIDAZOLE (METROCREAM) 0.75 % cream metronidazole 0.75 % topical cream    Historical Provider, MD   mometasone (NASONEX) 50 mcg/act nasal spray mometasone 50 mcg/actuation nasal spray    Historical Provider, MD   nortriptyline (PAMELOR) 10 mg capsule Take 10 mg by mouth 10/6/20 2/25/21  Historical Provider, MD   olopatadine (PATANOL) 0.1 % ophthalmic solution olopatadine 0.1 % eye drops    Historical Provider, MD   pantoprazole (PROTONIX) 40 mg tablet Take 1 tablet (40 mg total) by mouth daily 10/14/20   Karyn Royal PA-C   predniSONE 20 mg tablet prednisone 20 mg tablet    Historical Provider, MD   spironolactone (ALDACTONE) 25 mg tablet spironolactone 25 mg tablet    Historical Provider, MD   tacrolimus (PROTOPIC) 0.1 % ointment tacrolimus 0.1 % topical ointment    Historical Provider, MD   venlafaxine (Effexor XR) 37.5 mg 24 hr capsule every 24 hours 7/17/20   Historical Provider, MD GARDUNO have reviewed home medications with patient personally.    Allergies:   Allergies   Allergen Reactions    Penicillins Hives    Penicillin G Procaine      Other reaction(s): Unknown       Social History:     Marital Status: Unknown   Occupation: works win [employer]  Patient Pre-hospital Living Situation: home  Patient Pre-hospital Level of Mobility: no restrictions before today  Patient Pre-hospital Diet Restrictions: none  Substance Use History:   Social History     Substance and  Sexual Activity   Alcohol Use Not Currently    Comment: social     Social History     Tobacco Use   Smoking Status Never   Smokeless Tobacco Never     Social History     Substance and Sexual Activity   Drug Use Never       Family History:    Family History   Problem Relation Age of Onset    Diabetes Mother     Hypertension Mother     Hypertension Father        Physical Exam:     Vitals: Reviewed at the time of evaluation VS were stable      Physical Exam  Constitutional:       General: She is not in acute distress.     Appearance: Normal appearance.   HENT:      Head: Normocephalic and atraumatic.      Right Ear: External ear normal.      Left Ear: External ear normal.      Nose: Nose normal.      Mouth/Throat:      Mouth: Mucous membranes are dry.      Pharynx: Oropharynx is clear.   Neurological:      Mental Status: She is alert.      Sensory: No sensory deficit.      Motor: No weakness (LUE 2, LLL 1+ reflexes intact, babinski equivical in left , down going right).      Deep Tendon Reflexes: Reflexes normal.      Comments: Gait not test , no nystagmus. EOMI best exam pt is sleepy but appropriate able to give history.            Additional Data:     Lab Results: I have personally reviewed pertinent reports.      Results from last 7 days   Lab Units 03/27/24  0519 03/26/24  1647   WBC Thousand/uL 3.48* 3.85*   HEMOGLOBIN g/dL 10.9* 12.1   HEMATOCRIT % 33.7* 37.6   PLATELETS Thousands/uL 193 231   NEUTROS PCT %  --  33*   LYMPHS PCT %  --  55*   MONOS PCT %  --  10   EOS PCT %  --  2     Results from last 7 days   Lab Units 03/27/24  0519 03/26/24  1647   SODIUM mmol/L 139 139   POTASSIUM mmol/L 3.9 3.6   CHLORIDE mmol/L 110* 105   CO2 mmol/L 28 30   BUN mg/dL 13 16   CREATININE mg/dL 0.82 0.74   ANION GAP mmol/L 1* 4   CALCIUM mg/dL 8.1* 9.5   ALBUMIN g/dL  --  4.1   TOTAL BILIRUBIN mg/dL  --  0.31   ALK PHOS U/L  --  61   ALT U/L  --  14   AST U/L  --  18   GLUCOSE RANDOM mg/dL 80 109     Results from last 7 days    Lab Units 24  1647   INR  1.62*     Results from last 7 days   Lab Units 24  1657   POC GLUCOSE mg/dl 84               Imaging: I have personally reviewed pertinent reports.      XR stroke alert portable chest   Final Result by Jeanne Culver MD ( 172)      No acute pulmonary pathology.                  Workstation performed: [id number]         CTA stroke alert (head/neck)   Final Result by Jaydon Austin MD ( [id number])      CTA head:   -No large vessel occlusion or high-grade stenosis.   -Right posterior communicating artery infundibulum versus 1 mm aneurysm involving the right ICA terminus.      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 Differential includes atheromatous plaque. Recommend neurovascular consult.   -Question additional smaller carotid web involving the proximal left internal carotid artery versus atheromatous plaque.   -No high-grade stenosis, dissection or aneurysm.         I personally communicated the findings via telephone with Astrid Sharma at 5:34 p.m. on 3/26/2024.                              Workstation performed: [id number]         CT stroke alert brain   Final Result by Jaydon Austin MD ( [id number])   -Hypodensity involving the right insula/operculum likely representing age-indeterminate infarct, favored to be subacute to chronic. MRI can be obtained for further evaluation.   -No intracranial hemorrhage.      I personally communicated the findings via telephone with Astrid Sharma at 5:34 p.m. on 3/26/2024.         Workstation performed: [id number]         MRI brain wo contrast    (Results Pending)       EKG, Pathology, and Other Studies Reviewed on Admission:   EK bpm sinus    Allscripts / Epic Records Reviewed: Yes     ** Please Note: This note has been constructed using a voice recognition system. **

## 2024-03-27 NOTE — ASSESSMENT & PLAN NOTE
Headache symptom have improved with the cocktail prescribed by Neuro and ED MD. Pt resting comfortable  Continue to monitor on stroke protocol  Resume xarelto if pt not at risk or needing procedure in am.   Continue plavix, statin,

## 2024-03-27 NOTE — OCCUPATIONAL THERAPY NOTE
Occupational Therapy Evaluation        Patient Name: Clive Rae  Today's Date: 3/27/2024           03/27/24 0745   OT Last Visit   OT Visit Date 03/27/24   Note Type   Note type Evaluation   Additional Comments Vitals at start of session: /82, HR 74 bpm   Pain Assessment   Pain Assessment Tool 0-10   Pain Score 5   Pain Location/Orientation Orientation: Left;Location: Head   Pain Onset/Description Descriptor: Headache   Hospital Pain Intervention(s) Repositioned;Ambulation/increased activity   Restrictions/Precautions   Weight Bearing Precautions Per Order No   Braces or Orthoses Other (Comment)  (history of back brace)   Other Precautions Multiple lines;Telemetry;Fall Risk;Pain   Home Living   Type of Home Apartment  (10th floor in [facility])   Home Layout One level;Able to live on main level with bedroom/bathroom;Performs ADLs on one level;Elevator   Bathroom Shower/Tub Tub/shower unit   Bathroom Toilet Standard   Bathroom Equipment Other (Comment)  (none at baseline)   Bathroom Accessibility Accessible   Home Equipment Other (Comment)  (none at baseline)   Additional Comments Pt ambulates without an AD.   Prior Function   Level of Schle[place] Independent with functional mobility;Independent with ADLs   Lives With Alone   Receives Help From Family   IADLs Independent with driving;Independent with meal prep;Independent with medication management   Falls in the last 6 months 0   Vocational Full time employment   Comments Pt resides in [place]. Pt is currently visiting her parents in [place].   Lifestyle   Autonomy Patient was independnet with ADLs/ IADLs at baseline. Pt resides in [place]. Pt is currently visiting her parents in [place]. Patient lives in a one story apartment, no [email or web address] 10 th floor with elevator access. Patient ambulates without AD.   Reciprocal Relationships Supportive Family   Service to Others Full time employment   General    Family/Caregiver Present No   ADL   Where Assessed Other (Comment)  (ADL levels based on functional performance during OT eval)   Eating Assistance 7  Independent   Grooming Assistance 7  Independent   UB Bathing Assistance 7  Independent   LB Bathing Assistance 7  Independent   UB Dressing Assistance 7  Independent   LB Dressing Assistance 7  Independent   Toileting Assistance  7  Independent   Functional Assistance 7  Independent   Bed Mobility   Supine to Sit 6  Modified independent   Additional items HOB elevated   Sit to Supine 6  Modified independent   Additional items HOB elevated   Transfers   Sit to Stand 7  Independent   Stand to Sit 7  Independent   Functional Mobility   Functional Mobility 7  Independent   Balance   Static Sitting Normal   Dynamic Sitting Good   Static Standing Good   Dynamic Standing Good   Activity Tolerance   Activity Tolerance Patient tolerated treatment well   Medical Staff Made Aware Pt seen as a co-eval with PT due to the patient's co-morbidities, clinically unstable presentation, and present impairments which are a regression from the patient's baseline.   RUE Assessment   RUE Assessment WNL   LUE Assessment   LUE Assessment WNL   Hand Function   Gross Motor Coordination Functional   Fine Motor Coordination Functional   Sensation   Light Touch No apparent deficits  (BUEs)   Vision-Basic Assessment   Current Vision Wears glasses all the time   Cognition   Overall Cognitive Status WFL   Arousal/Participation Alert;Responsive;Cooperative   Attention Within functional limits   Orientation Level Oriented X4   Memory Within functional limits   Following Commands Follows all commands and directions without difficulty   Assessment   Assessment Patient is a 48 y.o. female seen for OT evaluation s/p admit to [facility] on 3/26/2024 w/Stroke (cerebrum) ([facility]). Commorbidities affecting patient's functional performance at time of assessment include: Sjogren's Disease, Migraine,  GERD, Positive cardiolipin antibodies, presented to ED with  headache and weakness of the LLE greater that the LUE.  CT head suggested right insular /operculum hypodensity that needs further characterization with MRI.  Orders placed for OT evaluation and treatment   Performed at least two patient identifiers during session including name and wristband. Prior to admission, Patient was independnet with ADLs/ IADLs at baseline. Pt [place] in [place]. Pt is currently visiting her parents in [place]. Patient lives in a one story apartment, no [address], 10 th floor with elevator access. Patient ambulates without AD.  Upon evaluation, patient requires independent assist for UB ADLs, independent assist for LB ADLs, transfers and functional ambulation in room and bathroom with independent assist, without AD.  Presents with functional use of BUEs, with intact prehension, coordination and symmetrical muscle strength.   Patient appears to be functioning at baseline. No further acute OT needs identified at this time to warrant continuation of services. D/C OT services. From OT standpoint, recommendation at time of d/c would be Home with family support.   Discharge Recommendation   Rehab Resource Intensity Level, OT No post-acute rehabilitation needs   AM-PAC Daily Activity Inpatient   Lower Body Dressing 4   Bathing 4   Toileting 4   Upper Body Dressing 4   Grooming 4   Eating 4   Daily Activity Raw Score 24   Daily Activity Standardized Score (Calc for Raw Score >=11) 57.54   AM-PAC Applied Cognition Inpatient   Following a Speech/Presentation 4   Understanding Ordinary Conversation 4   Taking Medications 4   Remembering Where Things Are Placed or Put Away 4   Remembering List of 4-5 Errands 4   Taking Care of Complicated Tasks 4   Applied Cognition Raw Score 24   Applied Cognition Standardized Score 62.21   Barthel Index   Feeding 10   Bathing 5   Grooming Score 5   Dressing Score 10   Bladder Score 10    Bowels Score 10   Toilet Use Score 10   Transfers (Bed/Chair) Score 15   Mobility (Level Surface) Score 15   Stairs Score 10   Barthel Index Score 100   Modified Canyon Scale   Modified Canyon Scale 0

## 2024-03-27 NOTE — CONSULTS
Consultation - Neurology   Clive Rae 48 y.o. female MRN: [id number]  Unit/Bed#: ED 11 Encounter: [id number]      Assessment/Plan     Stroke-like symptoms  Assessment & Plan  48-year-old female with history of prior stroke (right insular/right frontotemporoparietal,) October 2020),  chronic migraines for which she receives Botox, Sjogren's disease, and recent OSH admission in July 2023 for aphasia and right arm paresthesias.     Presents as stroke alert on 3/26 for headache, left-sided tingling and weakness upon waking up yesterday.  NIHSS 3.  Out of window for TNK, no IR target for endovascular intervention.    Admitted for workup, differential include cerebrovascular event versus recrudescence of prior stroke symptoms versus complex migraine.    3/27: both headache and L sided deficits have improved since yesterday, still with 5/10 headache, but improved since admission.    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    Plan:  -Acute ischemic stroke pathway initiated:  -MRI brain pending  -2D echocardiogram pending  -Patient clarifies she is on Xarelto 20 mg daily at home (given history of CVA and hypercoagulable lab-work in the past), will continue here and stop plavix (patient confirms not taking plavix at home)  -Hemoglobin A1C pending, lipid panel with LDL of 52  -Neuro checks  -Telemetry monitoring  -Stroke education  -Therapy evaluations  -For headache received Mg sulfate 2G, Reglan, Tylenol (ruling out acute CVA; if MRI brain negative for acute findings, can trial triptan/steroids/NSAIDs    Will further discuss plan of care with attending neurologist.     Recommendations for outpatient neurological follow up have yet to be determined.    History of Present Illness     Reason for Consult / Principal Problem: L sided numbness, headache,  history of migraines    HPI: Clive Rae is a 48 y.o. female with history as mentioned above in assessment who neurology was asked to evaluateregards to the above.    Patient notes history of migraines, receives Botox every 3 months for migraine prevention (which has been effective), due for next Botox injection in April. Otherwise takes Excedrin migraine PRN when needed. Noted with headache on Monday morning, continued throughout past two days; yesterday in particular she noted L LE tingling yesterday.     Given her symptoms she presented to the ED yesterday afternoon/evening where stroke alert was activated on her arrival.  Please see Dr. Sharma's quick note for stroke alert details.  Was not a candidate for any acute intervention.  Admitted under stroke pathway with concurrent headache management.    This morning, notes mild overall in headache, now 5/10 in severity, L sided deficits are also improved as of this morning. Ambulating to bathroom independently.     In reviewing Care Everywhere, numerous prior MRIs over the past several years. In reviewing notes, was placed on Xarelto several years ago around the time of her CVA, as she was found to have underlying hypercoagulable condition on labwork (previously followed with hematology; has tolerated Xarelto well over past few years without any issues). .     Consult to Neurology  Consult performed by: Eliceo Christian PA-C  Consult ordered by: Cassandra Frausto DO      Inpatient consult to Neurology  Consult performed by: Eliceo Christian PA-C  Consult ordered by: Galina Gloria MD          Review of Systems   Constitutional: Negative.    HENT: Negative.     Cardiovascular: Negative.    Gastrointestinal: Negative.    Musculoskeletal: Negative.    Neurological:  Positive for weakness, numbness and headaches. Negative for dizziness, tremors, seizures, syncope, facial asymmetry, speech difficulty and light-headedness.   All other ROS reviewed and  negative.    Historical Information   Past Medical History:   Diagnosis Date    Sjogren's disease (HCC)     Stroke (cerebrum) (HCC)     Stroke (HCC)      History reviewed. No pertinent surgical history.  Social History   Social History     Substance and Sexual Activity   Alcohol Use Not Currently    Comment: social     Social History     Substance and Sexual Activity   Drug Use Never     E-Cigarette/Vaping    E-Cigarette Use Never User      E-Cigarette/Vaping Substances     Social History     Tobacco Use   Smoking Status Never   Smokeless Tobacco Never     Family History:   Family History   Problem Relation Age of Onset    Diabetes Mother     Hypertension Mother     Hypertension Father        Review of previous medical records was completed.    Meds/Allergies   current meds:   Current Facility-Administered Medications   Medication Dose Route Frequency    acetaminophen (TYLENOL) tablet 650 mg  650 mg Oral Q6H PRN    atorvastatin (LIPITOR) tablet 40 mg  40 mg Oral Daily    clopidogrel (PLAVIX) tablet 75 mg  75 mg Oral Daily    famotidine (PEPCID) tablet 20 mg  20 mg Oral BID    heparin (porcine) subcutaneous injection 5,000 Units  5,000 Units Subcutaneous Q8H KAMALJIT    hydroxychloroquine (PLAQUENIL) tablet 200 mg  200 mg Oral Daily With Breakfast    ketotifen (ZADITOR) 0.025 % ophthalmic solution 1 drop  1 drop Both Eyes BID    ondansetron (ZOFRAN) injection 4 mg  4 mg Intravenous Q6H PRN    pantoprazole (PROTONIX) EC tablet 40 mg  40 mg Oral Daily    and PTA meds:   Prior to Admission Medications   Prescriptions Last Dose Informant Patient Reported? Taking?   Adapalene-Benzoyl Peroxide (Epiduo Forte) 0.3-2.5 % GEL  Self Yes No   Sig: Epiduo Forte 0.3 %-2.5 % topical gel with pump   APPLY A THIN LAYER TO THE AFFECTED AREA(S) OF THE FACE AND/OR UPPER TRUNK AFTER WASHING BY TOPICAL ROUTE ONCE DAILY   Azelastine HCl 137 MCG/SPRAY SOLN  Self Yes No   Sig: azelastine 137 mcg (0.1 %) nasal spray aerosol   Dermatological  "Products, Misc. (HYLATOPIC PLUS EX)  Self Yes No   Si mg 3 (three) times a day   Enoxaparin Sodium (LOVENOX IJ)  Self Yes No   Sig: Inject as directed   Ergocalciferol (VITAMIN D2 PO)  Self Yes No   Sig: Vitamin D2 1,250 mcg (50,000 unit) capsule   acyclovir (ZOVIRAX) 5 % ointment  Self Yes No   Sig: acyclovir 5 % topical ointment   atorvastatin (LIPITOR) 40 mg tablet  Self Yes No   Sig: Take 40 mg by mouth daily   clopidogrel (PLAVIX) 75 mg tablet  Self Yes No   Sig: Take 75 mg by mouth daily   desonide (Verdeso) 0.05 % foam  Self Yes No   Sig: Verdeso 0.05 % topical foam   Apply to affected area daily as directed   famotidine (PEPCID) 20 mg tablet  Self Yes No   Sig: Take 20 mg by mouth 2 (two) times a day   hydroxychloroquine (PLAQUENIL) 200 mg tablet  Self Yes No   Sig: hydroxychloroquine 200 mg tablet   metroNIDAZOLE (METROCREAM) 0.75 % cream  Self Yes No   Sig: metronidazole 0.75 % topical cream   mometasone (NASONEX) 50 mcg/act nasal spray  Self Yes No   Sig: mometasone 50 mcg/actuation nasal spray   nortriptyline (PAMELOR) 10 mg capsule  Self Yes No   Sig: Take 10 mg by mouth   olopatadine (PATANOL) 0.1 % ophthalmic solution  Self Yes No   Sig: olopatadine 0.1 % eye drops   pantoprazole (PROTONIX) 40 mg tablet  Self No No   Sig: Take 1 tablet (40 mg total) by mouth daily   predniSONE 20 mg tablet  Self Yes No   Sig: prednisone 20 mg tablet   spironolactone (ALDACTONE) 25 mg tablet  Self Yes No   Sig: spironolactone 25 mg tablet   tacrolimus (PROTOPIC) 0.1 % ointment  Self Yes No   Sig: tacrolimus 0.1 % topical ointment   venlafaxine (Effexor XR) 37.5 mg 24 hr capsule  Self Yes No   Sig: every 24 hours      Facility-Administered Medications: None       Allergies   Allergen Reactions    Penicillins Hives    Penicillin G Procaine      Other reaction(s): Unknown       Objective   Vitals:Blood pressure 133/82, pulse 76, temperature 97.5 °F (36.4 °C), temperature source Oral, resp. rate 18, height 5' 7\" (1.702 " m), weight 63 kg (139 lb), SpO2 96%.,Body mass index is 21.77 kg/m².    Intake/Output Summary (Last 24 hours) at 3/27/2024 0731  Last data filed at 3/26/2024 2126  Gross per 24 hour   Intake 1150 ml   Output --   Net 1150 ml       Invasive Devices:   Invasive Devices       Peripheral Intravenous Line  Duration             Peripheral IV 03/26/24 Left;Proximal;Ventral (anterior) Antecubital <1 day                  Examined alongside Dr. Sharma.     Physical Exam  Constitutional:       Appearance: Normal appearance.   HENT:      Head: Normocephalic and atraumatic.   Eyes:      Extraocular Movements: EOM normal.      Pupils: Pupils are equal, round, and reactive to light.   Cardiovascular:      Rate and Rhythm: Normal rate.   Pulmonary:      Effort: Pulmonary effort is normal.   Musculoskeletal:         General: Normal range of motion.      Cervical back: Normal range of motion and neck supple.   Skin:     General: Skin is warm and dry.   Neurological:      Mental Status: She is alert and oriented to person, place, and time.      Motor: Motor strength is normal.     Coordination: Finger-Nose-Finger Test and Heel to Shin Test normal.   Psychiatric:         Speech: Speech normal.       Neurologic Exam     Mental Status   Oriented to person, place, and time.   Attention: normal. Concentration: normal.   Speech: speech is normal   Normal comprehension.     Cranial Nerves     CN II   Visual fields full to confrontation.     CN III, IV, VI   Pupils are equal, round, and reactive to light.  Extraocular motions are normal.     CN V   Facial sensation intact.     CN VII   Facial expression full, symmetric.     CN VIII   CN VIII normal.     CN IX, X   CN IX normal.   CN X normal.     CN XI   CN XI normal.     CN XII   CN XII normal.     Motor Exam   Muscle bulk: normal  Overall muscle tone: normal  Right arm pronator drift: absent  Left arm pronator drift: absent    Strength   Strength 5/5 throughout.     Sensory Exam   Light touch  normal.     No obvious focal sensory deficits on exam this AM.      Gait, Coordination, and Reflexes     Coordination   Finger to nose coordination: normal  Heel to shin coordination: normal    Tremor   Resting tremor: absent  Intention tremor: absent      Lab Results: CBC:   Results from last 7 days   Lab Units 03/27/24  0519 03/26/24  1647   WBC Thousand/uL 3.48* 3.85*   RBC Million/uL 3.94 4.35   HEMOGLOBIN g/dL 10.9* 12.1   HEMATOCRIT % 33.7* 37.6   MCV fL 86 86   PLATELETS Thousands/uL 193 231   , BMP/CMP:   Results from last 7 days   Lab Units 03/27/24  0519 03/26/24  1647   SODIUM mmol/L 139 139   POTASSIUM mmol/L 3.9 3.6   CHLORIDE mmol/L 110* 105   CO2 mmol/L 28 30   BUN mg/dL 13 16   CREATININE mg/dL 0.82 0.74   CALCIUM mg/dL 8.1* 9.5   AST U/L  --  18   ALT U/L  --  14   ALK PHOS U/L  --  61   EGFR ml/min/1.73sq m 83 94   , HgBA1C:   , Coagulation:   Results from last 7 days   Lab Units 03/26/24  1647   INR  1.62*   , Lipid Profile:   Results from last 7 days   Lab Units 03/27/24  0519   HDL mg/dL 58   LDL CALC mg/dL 52   TRIGLYCERIDES mg/dL 35     Imaging Studies: I have personally reviewed pertinent films in PACS  XR stroke alert portable chest   Final Result by Jeanne Culver MD (03/26 1728)      No acute pulmonary pathology.                  Workstation performed: [id number]         CTA stroke alert (head/neck)   Final Result by Jaydon Austin MD (03/26 3286)      CTA head:   -No large vessel occlusion or high-grade stenosis.   -Right posterior communicating artery infundibulum versus 1 mm aneurysm involving the right ICA terminus.      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 Differential includes atheromatous plaque. Recommend neurovascular consult.   -Question additional smaller carotid web involving the proximal left internal carotid artery versus atheromatous plaque.   -No high-grade stenosis, dissection or aneurysm.         I personally communicated the findings  via telephone with Astrid Sharma at 5:34 p.m. on 3/26/2024.                              Workstation performed: [id number]         CT stroke alert brain   Final Result by Jaydon Austin MD (03/26 1740)   -Hypodensity involving the right insula/operculum likely representing age-indeterminate infarct, favored to be subacute to chronic. MRI can be obtained for further evaluation.   -No intracranial hemorrhage.      I personally communicated the findings via telephone with Astrid Sharma at 5:34 p.m. on 3/26/2024.         Workstation performed: [id number]         MRI Inpatient Order    (Results Pending)       EKG, Pathology, and Other Studies: I have personally reviewed pertinent reports.      VTE Prophylaxis: Sequential compression device (Venodyne)  and Heparin    Code Status: Level 1 - Full Code    Please see attendings attestation for total time spent/billing.  Discussed plan of care with patient and primary team: await MRI brain, differential includes complex migraine, stroke symptom recrudescence or new CVA. Continue Xarelto/statin, HA medications.     Please note dictation software was used in the formulation of this note.  Please keep that in mind in light of any grammatical errors.

## 2024-03-27 NOTE — ASSESSMENT & PLAN NOTE
48-year-old female with history of prior stroke (right insular/right frontotemporoparietal,) October 2020),  chronic migraines for which she receives Botox, Sjogren's disease, and recent OSH admission in July 2023 for aphasia and right arm paresthesias.     Presents as stroke alert on 3/26 for headache, left-sided tingling and weakness upon waking up yesterday.  NIHSS 3.  Out of window for TNK, no IR target for endovascular intervention.    Admitted for workup, differential include cerebrovascular event versus recrudescence of prior stroke symptoms versus complex migraine.    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

## 2024-03-27 NOTE — ASSESSMENT & PLAN NOTE
Pt developed headache and weakness of the LLE greater that the LUE. At the time of my exam, the left upper extremity was close to baseline. CTA Reports : right posterior communicating artery infundibulm vs aneurysm. 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  Note review of record shows other vascular anomolies (? Stenosis of superior mesenteric artery listed). Neurology had request to admit patient for further testing on our campus and treat with migraine cocktail which has helped her headache some. She reports some improvement in her upper extremity weakness. Patient reports she is taking xarelto( not lovenox) at home and plavix due to previous stroke which disturbed her speech. Plan for further clarification of anatomy with MRI, and echo to assess slight mururmer. Neurology to reasses in am.   Neurology consult   Keep plavix in place. For tonight order heparin for dvt prophylaxis and decide on continuing her evening xarelto in the am incase pt needs to be proceduralized .   MRI asap  F/u echo  Resume xarelto in am if not at risk for bleed or needs procedure to assess aneurysm.

## 2024-03-27 NOTE — ASSESSMENT & PLAN NOTE
Pt with sjorgrens continue hydroychloroquin  Provide eye drops and substitute saliva if patient needs.